# Patient Record
Sex: FEMALE | Race: WHITE | NOT HISPANIC OR LATINO
[De-identification: names, ages, dates, MRNs, and addresses within clinical notes are randomized per-mention and may not be internally consistent; named-entity substitution may affect disease eponyms.]

---

## 2017-02-27 ENCOUNTER — RESULT REVIEW (OUTPATIENT)
Age: 47
End: 2017-02-27

## 2017-02-28 ENCOUNTER — APPOINTMENT (OUTPATIENT)
Age: 47
End: 2017-02-28

## 2017-02-28 ENCOUNTER — OUTPATIENT (OUTPATIENT)
Dept: OUTPATIENT SERVICES | Facility: HOSPITAL | Age: 47
LOS: 1 days | End: 2017-02-28
Payer: COMMERCIAL

## 2017-02-28 DIAGNOSIS — R93.3 ABNORMAL FINDINGS ON DIAGNOSTIC IMAGING OF OTHER PARTS OF DIGESTIVE TRACT: ICD-10-CM

## 2017-02-28 DIAGNOSIS — R19.7 DIARRHEA, UNSPECIFIED: ICD-10-CM

## 2017-02-28 DIAGNOSIS — R14.0 ABDOMINAL DISTENSION (GASEOUS): ICD-10-CM

## 2017-02-28 DIAGNOSIS — R10.32 LEFT LOWER QUADRANT PAIN: ICD-10-CM

## 2017-02-28 DIAGNOSIS — K90.0 CELIAC DISEASE: ICD-10-CM

## 2017-02-28 DIAGNOSIS — R10.9 UNSPECIFIED ABDOMINAL PAIN: ICD-10-CM

## 2017-02-28 DIAGNOSIS — Z83.71 FAMILY HISTORY OF COLONIC POLYPS: ICD-10-CM

## 2017-02-28 PROCEDURE — 45380 COLONOSCOPY AND BIOPSY: CPT

## 2017-02-28 PROCEDURE — 88305 TISSUE EXAM BY PATHOLOGIST: CPT | Mod: 26

## 2017-02-28 PROCEDURE — 88342 IMHCHEM/IMCYTCHM 1ST ANTB: CPT | Mod: 26

## 2017-02-28 PROCEDURE — 88305 TISSUE EXAM BY PATHOLOGIST: CPT

## 2017-02-28 PROCEDURE — 43239 EGD BIOPSY SINGLE/MULTIPLE: CPT

## 2017-02-28 PROCEDURE — 88342 IMHCHEM/IMCYTCHM 1ST ANTB: CPT

## 2017-03-01 LAB — SURGICAL PATHOLOGY FINAL REPORT - CH: SIGNIFICANT CHANGE UP

## 2017-08-03 ENCOUNTER — OUTPATIENT (OUTPATIENT)
Dept: OUTPATIENT SERVICES | Facility: HOSPITAL | Age: 47
LOS: 1 days | End: 2017-08-03
Payer: COMMERCIAL

## 2017-08-03 ENCOUNTER — TRANSCRIPTION ENCOUNTER (OUTPATIENT)
Age: 47
End: 2017-08-03

## 2017-08-03 DIAGNOSIS — G90.521 COMPLEX REGIONAL PAIN SYNDROME I OF RIGHT LOWER LIMB: ICD-10-CM

## 2017-08-03 PROCEDURE — 76000 FLUOROSCOPY <1 HR PHYS/QHP: CPT

## 2017-08-03 PROCEDURE — 64520 N BLOCK LUMBAR/THORACIC: CPT

## 2017-09-14 ENCOUNTER — OUTPATIENT (OUTPATIENT)
Dept: OUTPATIENT SERVICES | Facility: HOSPITAL | Age: 47
LOS: 1 days | End: 2017-09-14
Payer: COMMERCIAL

## 2017-09-14 ENCOUNTER — TRANSCRIPTION ENCOUNTER (OUTPATIENT)
Age: 47
End: 2017-09-14

## 2017-09-14 DIAGNOSIS — G90.521 COMPLEX REGIONAL PAIN SYNDROME I OF RIGHT LOWER LIMB: ICD-10-CM

## 2017-09-14 PROCEDURE — 76000 FLUOROSCOPY <1 HR PHYS/QHP: CPT

## 2017-09-14 PROCEDURE — 64520 N BLOCK LUMBAR/THORACIC: CPT | Mod: RT

## 2017-11-09 ENCOUNTER — TRANSCRIPTION ENCOUNTER (OUTPATIENT)
Age: 47
End: 2017-11-09

## 2017-11-09 ENCOUNTER — OUTPATIENT (OUTPATIENT)
Dept: OUTPATIENT SERVICES | Facility: HOSPITAL | Age: 47
LOS: 1 days | End: 2017-11-09
Payer: COMMERCIAL

## 2017-11-09 DIAGNOSIS — G90.521 COMPLEX REGIONAL PAIN SYNDROME I OF RIGHT LOWER LIMB: ICD-10-CM

## 2017-11-09 PROCEDURE — 64520 N BLOCK LUMBAR/THORACIC: CPT | Mod: RT

## 2017-11-09 PROCEDURE — 76000 FLUOROSCOPY <1 HR PHYS/QHP: CPT

## 2017-12-21 ENCOUNTER — APPOINTMENT (OUTPATIENT)
Dept: CARDIOLOGY | Facility: CLINIC | Age: 47
End: 2017-12-21

## 2017-12-22 ENCOUNTER — APPOINTMENT (OUTPATIENT)
Dept: CARDIOLOGY | Facility: CLINIC | Age: 47
End: 2017-12-22

## 2018-02-22 ENCOUNTER — APPOINTMENT (OUTPATIENT)
Dept: ORTHOPEDIC SURGERY | Facility: CLINIC | Age: 48
End: 2018-02-22

## 2018-03-08 ENCOUNTER — OUTPATIENT (OUTPATIENT)
Dept: OUTPATIENT SERVICES | Facility: HOSPITAL | Age: 48
LOS: 1 days | End: 2018-03-08
Payer: COMMERCIAL

## 2018-03-08 ENCOUNTER — TRANSCRIPTION ENCOUNTER (OUTPATIENT)
Age: 48
End: 2018-03-08

## 2018-03-08 DIAGNOSIS — G90.521 COMPLEX REGIONAL PAIN SYNDROME I OF RIGHT LOWER LIMB: ICD-10-CM

## 2018-03-08 PROCEDURE — 76000 FLUOROSCOPY <1 HR PHYS/QHP: CPT

## 2018-03-08 PROCEDURE — 64520 N BLOCK LUMBAR/THORACIC: CPT | Mod: RT

## 2018-05-11 ENCOUNTER — APPOINTMENT (OUTPATIENT)
Dept: PULMONOLOGY | Facility: CLINIC | Age: 48
End: 2018-05-11
Payer: COMMERCIAL

## 2018-05-11 VITALS
WEIGHT: 207 LBS | BODY MASS INDEX: 36.67 KG/M2 | SYSTOLIC BLOOD PRESSURE: 132 MMHG | HEART RATE: 74 BPM | OXYGEN SATURATION: 98 % | DIASTOLIC BLOOD PRESSURE: 80 MMHG

## 2018-05-11 DIAGNOSIS — J32.9 CHRONIC SINUSITIS, UNSPECIFIED: ICD-10-CM

## 2018-05-11 PROCEDURE — 99215 OFFICE O/P EST HI 40 MIN: CPT | Mod: 25

## 2018-05-11 PROCEDURE — 94010 BREATHING CAPACITY TEST: CPT

## 2018-05-11 RX ORDER — PREDNISONE 20 MG/1
20 TABLET ORAL
Qty: 10 | Refills: 0 | Status: DISCONTINUED | COMMUNITY
Start: 2018-03-12

## 2018-05-11 RX ORDER — ALPRAZOLAM 1 MG/1
1 TABLET ORAL
Qty: 2 | Refills: 0 | Status: DISCONTINUED | COMMUNITY
Start: 2018-03-20

## 2018-05-11 RX ORDER — AMOXICILLIN 875 MG/1
875 TABLET, FILM COATED ORAL
Qty: 20 | Refills: 0 | Status: DISCONTINUED | COMMUNITY
Start: 2017-12-06

## 2018-05-11 RX ORDER — FLUNISOLIDE 0.25 MG/ML
25 MCG/ACT SOLUTION NASAL
Qty: 25 | Refills: 0 | Status: DISCONTINUED | COMMUNITY
Start: 2017-12-22

## 2018-05-11 RX ORDER — LIDOCAINE 50 MG/G
5 CREAM TOPICAL
Qty: 15 | Refills: 0 | Status: DISCONTINUED | COMMUNITY
Start: 2018-02-22

## 2018-05-11 RX ORDER — ONDANSETRON 4 MG/1
4 TABLET, ORALLY DISINTEGRATING ORAL
Qty: 20 | Refills: 0 | Status: DISCONTINUED | COMMUNITY
Start: 2018-02-02

## 2018-05-11 RX ORDER — PHENAZOPYRIDINE HYDROCHLORIDE 200 MG/1
200 TABLET ORAL
Qty: 6 | Refills: 0 | Status: DISCONTINUED | COMMUNITY
Start: 2018-01-05

## 2018-05-11 RX ORDER — LISINOPRIL 5 MG/1
5 TABLET ORAL
Qty: 90 | Refills: 0 | Status: DISCONTINUED | COMMUNITY
Start: 2018-01-14

## 2018-05-11 RX ORDER — KETOROLAC TROMETHAMINE 10 MG/1
10 TABLET, FILM COATED ORAL
Qty: 9 | Refills: 0 | Status: DISCONTINUED | COMMUNITY
Start: 2018-03-09

## 2018-05-11 RX ORDER — AMOXICILLIN AND CLAVULANATE POTASSIUM 875; 125 MG/1; MG/1
875-125 TABLET, COATED ORAL
Qty: 20 | Refills: 0 | Status: DISCONTINUED | COMMUNITY
Start: 2018-03-12

## 2018-05-11 RX ORDER — CIPROFLOXACIN HYDROCHLORIDE 500 MG/1
500 TABLET, FILM COATED ORAL
Qty: 10 | Refills: 0 | Status: DISCONTINUED | COMMUNITY
Start: 2018-01-15

## 2018-05-11 RX ORDER — OSELTAMIVIR PHOSPHATE 75 MG/1
75 CAPSULE ORAL
Qty: 10 | Refills: 0 | Status: DISCONTINUED | COMMUNITY
Start: 2018-02-02

## 2018-05-11 RX ORDER — CIPROFLOXACIN AND DEXAMETHASONE 3; 1 MG/ML; MG/ML
0.3-0.1 SUSPENSION/ DROPS AURICULAR (OTIC)
Qty: 7 | Refills: 0 | Status: DISCONTINUED | COMMUNITY
Start: 2017-12-22

## 2018-05-11 RX ORDER — AZELASTINE HYDROCHLORIDE 137 UG/1
0.1 SPRAY, METERED NASAL
Qty: 30 | Refills: 0 | Status: DISCONTINUED | COMMUNITY
Start: 2017-12-22

## 2018-05-11 RX ORDER — ACETYLCYSTEINE 200 MG/ML
20 SOLUTION ORAL; RESPIRATORY (INHALATION)
Qty: 100 | Refills: 0 | Status: DISCONTINUED | COMMUNITY
Start: 2018-04-26

## 2018-05-11 RX ORDER — ALPRAZOLAM 0.25 MG/1
0.25 TABLET ORAL
Qty: 20 | Refills: 0 | Status: DISCONTINUED | COMMUNITY
Start: 2018-01-05

## 2018-05-11 RX ORDER — METHYLPREDNISOLONE 4 MG/1
4 TABLET ORAL
Qty: 21 | Refills: 0 | Status: DISCONTINUED | COMMUNITY
Start: 2017-12-22

## 2018-05-11 RX ORDER — NITROFURANTOIN (MONOHYDRATE/MACROCRYSTALS) 25; 75 MG/1; MG/1
100 CAPSULE ORAL
Qty: 10 | Refills: 0 | Status: DISCONTINUED | COMMUNITY
Start: 2018-01-05

## 2018-05-11 RX ORDER — AZITHROMYCIN 250 MG/1
250 TABLET, FILM COATED ORAL
Qty: 6 | Refills: 0 | Status: DISCONTINUED | COMMUNITY
Start: 2017-12-22

## 2018-05-11 RX ORDER — FAMOTIDINE 40 MG/1
40 TABLET, FILM COATED ORAL
Qty: 30 | Refills: 0 | Status: DISCONTINUED | COMMUNITY
Start: 2018-04-26

## 2018-05-17 ENCOUNTER — APPOINTMENT (OUTPATIENT)
Dept: GASTROENTEROLOGY | Facility: CLINIC | Age: 48
End: 2018-05-17
Payer: COMMERCIAL

## 2018-05-17 VITALS
DIASTOLIC BLOOD PRESSURE: 87 MMHG | HEART RATE: 75 BPM | HEIGHT: 63 IN | RESPIRATION RATE: 16 BRPM | OXYGEN SATURATION: 98 % | WEIGHT: 209 LBS | SYSTOLIC BLOOD PRESSURE: 127 MMHG | BODY MASS INDEX: 37.03 KG/M2

## 2018-05-17 PROCEDURE — 99214 OFFICE O/P EST MOD 30 MIN: CPT

## 2018-11-29 ENCOUNTER — APPOINTMENT (OUTPATIENT)
Dept: ORTHOPEDIC SURGERY | Facility: CLINIC | Age: 48
End: 2018-11-29
Payer: COMMERCIAL

## 2018-11-29 VITALS
SYSTOLIC BLOOD PRESSURE: 128 MMHG | WEIGHT: 213 LBS | HEIGHT: 63 IN | HEART RATE: 56 BPM | DIASTOLIC BLOOD PRESSURE: 82 MMHG | BODY MASS INDEX: 37.74 KG/M2

## 2018-11-29 DIAGNOSIS — Z87.09 PERSONAL HISTORY OF OTHER DISEASES OF THE RESPIRATORY SYSTEM: ICD-10-CM

## 2018-11-29 DIAGNOSIS — Z82.61 FAMILY HISTORY OF ARTHRITIS: ICD-10-CM

## 2018-11-29 DIAGNOSIS — Z78.9 OTHER SPECIFIED HEALTH STATUS: ICD-10-CM

## 2018-11-29 PROCEDURE — 99204 OFFICE O/P NEW MOD 45 MIN: CPT | Mod: 25

## 2018-11-29 PROCEDURE — 20550 NJX 1 TENDON SHEATH/LIGAMENT: CPT | Mod: 59

## 2019-01-03 ENCOUNTER — APPOINTMENT (OUTPATIENT)
Dept: ORTHOPEDIC SURGERY | Facility: CLINIC | Age: 49
End: 2019-01-03
Payer: COMMERCIAL

## 2019-01-03 VITALS
SYSTOLIC BLOOD PRESSURE: 126 MMHG | BODY MASS INDEX: 37.74 KG/M2 | HEIGHT: 63 IN | WEIGHT: 213 LBS | DIASTOLIC BLOOD PRESSURE: 85 MMHG | HEART RATE: 62 BPM

## 2019-01-03 DIAGNOSIS — M65.331 TRIGGER FINGER, RIGHT MIDDLE FINGER: ICD-10-CM

## 2019-01-03 DIAGNOSIS — M65.341 TRIGGER FINGER, RIGHT RING FINGER: ICD-10-CM

## 2019-01-03 PROCEDURE — 99214 OFFICE O/P EST MOD 30 MIN: CPT

## 2019-01-08 ENCOUNTER — OTHER (OUTPATIENT)
Age: 49
End: 2019-01-08

## 2019-01-31 ENCOUNTER — APPOINTMENT (OUTPATIENT)
Dept: ORTHOPEDIC SURGERY | Facility: CLINIC | Age: 49
End: 2019-01-31

## 2019-04-30 ENCOUNTER — APPOINTMENT (OUTPATIENT)
Dept: CARDIOLOGY | Facility: CLINIC | Age: 49
End: 2019-04-30

## 2019-06-06 ENCOUNTER — TRANSCRIPTION ENCOUNTER (OUTPATIENT)
Age: 49
End: 2019-06-06

## 2019-06-07 ENCOUNTER — OUTPATIENT (OUTPATIENT)
Dept: OUTPATIENT SERVICES | Facility: HOSPITAL | Age: 49
LOS: 1 days | End: 2019-06-07
Payer: COMMERCIAL

## 2019-06-07 DIAGNOSIS — M47.817 SPONDYLOSIS WITHOUT MYELOPATHY OR RADICULOPATHY, LUMBOSACRAL REGION: ICD-10-CM

## 2019-06-20 ENCOUNTER — TRANSCRIPTION ENCOUNTER (OUTPATIENT)
Age: 49
End: 2019-06-20

## 2019-06-21 ENCOUNTER — OUTPATIENT (OUTPATIENT)
Dept: OUTPATIENT SERVICES | Facility: HOSPITAL | Age: 49
LOS: 1 days | End: 2019-06-21
Payer: COMMERCIAL

## 2019-06-21 DIAGNOSIS — M46.1 SACROILIITIS, NOT ELSEWHERE CLASSIFIED: ICD-10-CM

## 2019-06-21 PROCEDURE — 64494 INJ PARAVERT F JNT L/S 2 LEV: CPT | Mod: RT

## 2019-06-21 PROCEDURE — 64493 INJ PARAVERT F JNT L/S 1 LEV: CPT | Mod: RT

## 2019-06-21 PROCEDURE — 64495 INJ PARAVERT F JNT L/S 3 LEV: CPT | Mod: RT

## 2019-06-21 PROCEDURE — 76000 FLUOROSCOPY <1 HR PHYS/QHP: CPT

## 2019-06-21 PROCEDURE — G0260: CPT | Mod: 50

## 2019-08-16 ENCOUNTER — EMERGENCY (EMERGENCY)
Facility: HOSPITAL | Age: 49
LOS: 1 days | Discharge: HOME | End: 2019-08-16
Attending: EMERGENCY MEDICINE
Payer: COMMERCIAL

## 2019-08-16 VITALS
HEART RATE: 69 BPM | WEIGHT: 205.03 LBS | SYSTOLIC BLOOD PRESSURE: 121 MMHG | HEIGHT: 63 IN | DIASTOLIC BLOOD PRESSURE: 79 MMHG | RESPIRATION RATE: 18 BRPM | TEMPERATURE: 98 F | OXYGEN SATURATION: 97 %

## 2019-08-16 PROCEDURE — 96372 THER/PROPH/DIAG INJ SC/IM: CPT

## 2019-08-16 PROCEDURE — 99283 EMERGENCY DEPT VISIT LOW MDM: CPT | Mod: 25

## 2019-08-16 PROCEDURE — 99283 EMERGENCY DEPT VISIT LOW MDM: CPT

## 2019-08-16 RX ORDER — LIDOCAINE 4 G/100G
1 CREAM TOPICAL ONCE
Refills: 0 | Status: COMPLETED | OUTPATIENT
Start: 2019-08-16 | End: 2019-08-16

## 2019-08-16 RX ORDER — ACETAMINOPHEN 500 MG
975 TABLET ORAL ONCE
Refills: 0 | Status: COMPLETED | OUTPATIENT
Start: 2019-08-16 | End: 2019-08-16

## 2019-08-16 RX ORDER — KETOROLAC TROMETHAMINE 30 MG/ML
30 SYRINGE (ML) INJECTION ONCE
Refills: 0 | Status: DISCONTINUED | OUTPATIENT
Start: 2019-08-16 | End: 2019-08-16

## 2019-08-16 RX ORDER — CYCLOBENZAPRINE HYDROCHLORIDE 10 MG/1
1 TABLET, FILM COATED ORAL
Qty: 6 | Refills: 0
Start: 2019-08-16 | End: 2019-08-17

## 2019-08-16 RX ORDER — CYCLOBENZAPRINE HYDROCHLORIDE 10 MG/1
5 TABLET, FILM COATED ORAL ONCE
Refills: 0 | Status: COMPLETED | OUTPATIENT
Start: 2019-08-16 | End: 2019-08-16

## 2019-08-16 RX ADMIN — Medication 30 MILLIGRAM(S): at 12:17

## 2019-08-16 RX ADMIN — CYCLOBENZAPRINE HYDROCHLORIDE 5 MILLIGRAM(S): 10 TABLET, FILM COATED ORAL at 12:17

## 2019-08-16 RX ADMIN — LIDOCAINE 1 PATCH: 4 CREAM TOPICAL at 12:19

## 2019-08-16 RX ADMIN — Medication 975 MILLIGRAM(S): at 12:16

## 2019-08-16 NOTE — ED PROVIDER NOTE - PHYSICAL EXAMINATION
MSK: TTP paraspinal muscle at lumbar region. no midline tenderness / stepoff. Full ROM b/l upper and lower extrems. Limited dorsal flexion of R foot secondary to  pain.

## 2019-08-16 NOTE — ED PROVIDER NOTE - CLINICAL SUMMARY MEDICAL DECISION MAKING FREE TEXT BOX
Exacerbation of chronic back pain, pain control, ortho spine referral. Exacerbation of chronic back pain, pain control, Neurology referral.

## 2019-08-16 NOTE — ED PROVIDER NOTE - ATTENDING CONTRIBUTION TO CARE
Patient presents with back pain.  discussed options and treatment progression.  feeling improved after meds.  will f/u.  Non toxic.  Well appearing. Uneventful ED observation period. Discussed results and outcome of testing with the patient.  Patient advised to please follow up with their primary care doctor within the next 24 hours and return to the Emergency Department for worsening symptoms or any other concerns.  Patient advised that their doctor may call  to follow up on the specific results of the tests performed today in the emergency department.

## 2019-08-16 NOTE — ED PROVIDER NOTE - OBJECTIVE STATEMENT
Pt is a 49 y/o F, PMH significant for R foot RSD, chronic back pain, presents to ED c/o lower back pain. Pt states she had an accident 4 years ago that left her with chronic back pain. Pt was being followed by pain management until last month when she was told she needed to follow up with orthopaedics for further management. Pt states she has not attempted to take any medication in attempt to alleviate her symptoms. Pt states the pain begins in her lower back and radiates down her R leg into her R foot. Pt also states she has some paresthesias in her lateral R calf. Pt has not had any recent falls or traumatic events. Pt denies urinary retention / incontinence, CP, recent unexplained weight loss, fevers, chills.

## 2019-09-23 ENCOUNTER — OTHER (OUTPATIENT)
Age: 49
End: 2019-09-23

## 2019-10-01 ENCOUNTER — APPOINTMENT (OUTPATIENT)
Dept: ORTHOPEDIC SURGERY | Facility: CLINIC | Age: 49
End: 2019-10-01
Payer: COMMERCIAL

## 2019-10-01 VITALS
HEIGHT: 63 IN | DIASTOLIC BLOOD PRESSURE: 77 MMHG | HEART RATE: 62 BPM | WEIGHT: 213 LBS | BODY MASS INDEX: 37.74 KG/M2 | SYSTOLIC BLOOD PRESSURE: 118 MMHG

## 2019-10-01 PROCEDURE — 73502 X-RAY EXAM HIP UNI 2-3 VIEWS: CPT | Mod: RT

## 2019-10-01 PROCEDURE — 20610 DRAIN/INJ JOINT/BURSA W/O US: CPT | Mod: RT

## 2019-10-01 PROCEDURE — 99204 OFFICE O/P NEW MOD 45 MIN: CPT | Mod: 25

## 2019-10-01 RX ORDER — MELOXICAM 15 MG/1
15 TABLET ORAL DAILY
Qty: 14 | Refills: 0 | Status: DISCONTINUED | COMMUNITY
Start: 2019-01-03 | End: 2019-10-01

## 2019-10-01 RX ORDER — METHYLPREDNISOLONE ACETATE 40 MG/ML
40 INJECTION, SUSPENSION INTRA-ARTICULAR; INTRALESIONAL; INTRAMUSCULAR; SOFT TISSUE
Qty: 1 | Refills: 0 | Status: DISCONTINUED | COMMUNITY
Start: 2018-11-29 | End: 2019-10-01

## 2019-10-01 RX ORDER — RANITIDINE 300 MG/1
300 TABLET ORAL
Qty: 90 | Refills: 1 | Status: DISCONTINUED | COMMUNITY
Start: 2018-05-17 | End: 2019-10-01

## 2019-10-01 RX ORDER — PREDNISONE 10 MG/1
10 TABLET ORAL
Qty: 3 | Refills: 0 | Status: DISCONTINUED | COMMUNITY
Start: 2018-04-26 | End: 2019-10-01

## 2019-10-01 RX ORDER — LANSOPRAZOLE 15 MG/1
15 CAPSULE, DELAYED RELEASE ORAL
Refills: 0 | Status: DISCONTINUED | COMMUNITY
End: 2019-10-01

## 2019-10-01 NOTE — REVIEW OF SYSTEMS
[Joint Pain] : joint pain [Joint Stiffness] : joint stiffness [Feeling Tired] : fatigue [Heartburn] : heartburn [Sleep Disturbances] : ~T sleep disturbances

## 2019-10-01 NOTE — CONSULT LETTER
[Dear  ___] : Dear  [unfilled], [Consult Letter:] : I had the pleasure of evaluating your patient, [unfilled]. [Please see my note below.] : Please see my note below. [Consult Closing:] : Thank you very much for allowing me to participate in the care of this patient.  If you have any questions, please do not hesitate to contact me. [Sincerely,] : Sincerely, [FreeTextEntry2] : CAROLYN LOCKETT MD\par  [FreeTextEntry3] : Pedrito Gonsalez MD\par Chief of Joint Replacement\par Primary & Revision Hip and Knee Replacement \par [facility] [place]\par \par

## 2019-10-01 NOTE — PHYSICAL EXAM
[de-identified] : The patient appears well nourished  and in no apparent distress.  The patient is alert and oriented to person, place, and time.   Affect and mood appear normal. The head is normocephalic and atraumatic.  The eyes reveal normal sclera and extra ocular muscles are intact. The tongue is midline with no apparent lesions.  Skin shows normal turgor with no evidence of eczema or psoriasis.  No respiratory distress noted.  Sensation grossly intact.\par   [de-identified] : Exam of the right hip shows numbness and pain of distribution of lateral femoral cutaneous nerve, no pain with ROM, pain with palpation of the trochanteric bursa. 5/5 motor strength bilaterally distally. Sensation intact distally.  [de-identified] : Xray- AP pelvis and 2 views right hip shows well preserved joint spaces of the right hip.

## 2019-10-01 NOTE — HISTORY OF PRESENT ILLNESS
[de-identified] : The patient is a 49 year old female being seen for evaluation of her right hip.She denies injury, trauma, or change of activity. She reports a 2 month duration of lateral hip pain. She describes her symptoms as 7-8/10 on the pain scale. She reports pain over the superior iliac crest and in the region of the greater trochanter. At times with transferring pain will radiate into the groin. She will note pain after prolonged walking. She is unable to lie on her right side at night. In addition she reports associated numbness and a "cold sensation" radiating down the lateral hip at times. She does have an appointment to followup with a neurologist. She is not taking any medication. She is not doing any formal exercise.

## 2019-10-01 NOTE — PROCEDURE
[de-identified] : Verbal consent was obtained and all questions, comments and concerns were addressed. After the greater trochanteric area on the right side was anesthetized with ethyl chloride, it was cleansed with chlorhexidine . Using sterile technique, at the point of maximum tenderness, 5cc of 1 percent lidocaine without epinephrine, 2cc of 0.25% marcaine, and 80 mg depomedrol was injected. The patient tolerated the procedure well. A dry sterile dressing was placed and the patient described relief of pain 5 minutes after the procedure was performed. The patient was able to leave the office under their own power.\par \par \par

## 2019-10-01 NOTE — DISCUSSION/SUMMARY
[de-identified] : The patient is a 49 year old female with pain in the distribution of lateral femoral cutaneous nerve as well as trochanteric bursitis of the right hip. Conservative options were discussed.  For the meralgia paresthetica she has an appointment with a neurologist.  She was give a cortisone injection in the right hip bursa. She was given a prescription for physical therapy and anti-inflammatories. I recommended a course of Mobic. The patient was given a prescription for the Mobic with directions. She was instructed to stop the medicine and call the office if there are any adverse reaction to the medicine. She was also instructed to consult with their primary care doctor prior to starting the medication. She may follow up as needed.

## 2019-10-01 NOTE — ADDENDUM
[FreeTextEntry1] : This note was authored by Juni Rangel working as a medical scribe for Dr. Pedrito Gonsalez. The note was reviewed, edited, and revised by Dr. Pedrito Gonsalez whom is in agreement with the exam findings, imaging findings, and treatment plan. 10/01/2019.

## 2020-01-06 ENCOUNTER — EMERGENCY (EMERGENCY)
Facility: HOSPITAL | Age: 50
LOS: 1 days | Discharge: HOME | End: 2020-01-06
Attending: EMERGENCY MEDICINE
Payer: COMMERCIAL

## 2020-01-06 VITALS
HEART RATE: 65 BPM | RESPIRATION RATE: 24 BRPM | DIASTOLIC BLOOD PRESSURE: 84 MMHG | HEIGHT: 63 IN | TEMPERATURE: 98 F | SYSTOLIC BLOOD PRESSURE: 123 MMHG | WEIGHT: 199.96 LBS | OXYGEN SATURATION: 96 %

## 2020-01-06 PROCEDURE — 99284 EMERGENCY DEPT VISIT MOD MDM: CPT

## 2020-01-06 PROCEDURE — 71045 X-RAY EXAM CHEST 1 VIEW: CPT

## 2020-01-06 PROCEDURE — 71045 X-RAY EXAM CHEST 1 VIEW: CPT | Mod: 26

## 2020-01-06 PROCEDURE — 94640 AIRWAY INHALATION TREATMENT: CPT

## 2020-01-06 PROCEDURE — 99283 EMERGENCY DEPT VISIT LOW MDM: CPT | Mod: 25

## 2020-01-06 RX ORDER — IPRATROPIUM/ALBUTEROL SULFATE 18-103MCG
3 AEROSOL WITH ADAPTER (GRAM) INHALATION ONCE
Refills: 0 | Status: COMPLETED | OUTPATIENT
Start: 2020-01-06 | End: 2020-01-06

## 2020-01-06 RX ORDER — LISINOPRIL 2.5 MG/1
1 TABLET ORAL
Qty: 0 | Refills: 0 | DISCHARGE

## 2020-01-06 RX ADMIN — Medication 3 MILLILITER(S): at 04:23

## 2020-01-06 RX ADMIN — Medication 50 MILLIGRAM(S): at 04:23

## 2020-01-06 NOTE — ED ADULT NURSE NOTE - OBJECTIVE STATEMENT
patient presents to ED with cough and SOB worsening over the last few days and cough with brown mucous. hx of asthma, took home nebs with no relief. friday went to PMD and was dx with flu A. on tamiflu and PO steroids at home. c/o back pain with cough, did not take anything for pain.

## 2020-01-06 NOTE — ED PROVIDER NOTE - OBJECTIVE STATEMENT
pt dx with flu 3 days prior by pcp on tamiflu presents with persistent cough and wheeze. denies fever. denies HA or neck pain. no chest pain or sob. no abd pain. no n/v/d. no urinary f/u/d. no back pain. no motor or sensory deficits. denies illicit drug use. no recent travel. no rash. no other acute issues symptoms or concerns no hemotpysis

## 2020-01-06 NOTE — ED ADULT NURSE NOTE - CHIEF COMPLAINT QUOTE
patient states that she has the FLU was DX and today feeling SOB, has Asthma used neb treatments not working

## 2020-01-06 NOTE — ED ADULT TRIAGE NOTE - MODE OF ARRIVAL
PATIENT GIVEN SURGICAL SITE INFECTION FAQ HANDOUT AND HAND WASHING TIP SHEET. PREOP INSTRUCTIONS REVIEWED AND PATIENT VERBALIZES UNDERSTANDING OF INSTRUCTIONS. PATIENT HAS BEEN GIVEN THE OPPORTUNITY TO ASK QUESTIONS.  HIBICLENS C INSTRUCTIONS WERE GIVEN TO THE PT.
Walk in

## 2020-01-06 NOTE — ED PROVIDER NOTE - PATIENT PORTAL LINK FT
You can access the FollowMyHealth Patient Portal offered by [facility] by registering at the following website: [email or web address]. By joining [employer]’s FollowMyHealth portal, you will also be able to view your health information using other applications (apps) compatible with our system.

## 2020-01-06 NOTE — ED PROVIDER NOTE - CLINICAL SUMMARY MEDICAL DECISION MAKING FREE TEXT BOX
in nad cxr no pneumoniia f.u pcp return to ed for intractable cp sor or new onset motor or sensory deficits

## 2020-01-07 NOTE — ED POST DISCHARGE NOTE - DETAILS
Spoke to pt, informed of results, given information for CT surgeon Dr. Ford and advised to f/u with PMD. Pt verbalized understanding and agreement of plan.

## 2020-01-09 PROBLEM — J45.909 UNSPECIFIED ASTHMA, UNCOMPLICATED: Chronic | Status: ACTIVE | Noted: 2020-01-06

## 2020-01-09 PROBLEM — I10 ESSENTIAL (PRIMARY) HYPERTENSION: Chronic | Status: ACTIVE | Noted: 2020-01-06

## 2020-01-31 ENCOUNTER — APPOINTMENT (OUTPATIENT)
Dept: PULMONOLOGY | Facility: CLINIC | Age: 50
End: 2020-01-31
Payer: COMMERCIAL

## 2020-01-31 VITALS
SYSTOLIC BLOOD PRESSURE: 122 MMHG | HEIGHT: 63 IN | HEART RATE: 74 BPM | OXYGEN SATURATION: 97 % | WEIGHT: 211 LBS | DIASTOLIC BLOOD PRESSURE: 84 MMHG | BODY MASS INDEX: 37.39 KG/M2

## 2020-01-31 DIAGNOSIS — R91.8 OTHER NONSPECIFIC ABNORMAL FINDING OF LUNG FIELD: ICD-10-CM

## 2020-01-31 PROCEDURE — 99215 OFFICE O/P EST HI 40 MIN: CPT | Mod: 25

## 2020-01-31 PROCEDURE — 94010 BREATHING CAPACITY TEST: CPT

## 2020-01-31 RX ORDER — SUCRALFATE 1 G/10ML
1 SUSPENSION ORAL 4 TIMES DAILY
Qty: 1200 | Refills: 1 | Status: DISCONTINUED | COMMUNITY
Start: 2018-05-17 | End: 2020-01-31

## 2020-01-31 RX ORDER — BUDESONIDE AND FORMOTEROL FUMARATE DIHYDRATE 160; 4.5 UG/1; UG/1
160-4.5 AEROSOL RESPIRATORY (INHALATION) TWICE DAILY
Qty: 1 | Refills: 5 | Status: DISCONTINUED | COMMUNITY
Start: 2018-04-26 | End: 2020-01-31

## 2020-01-31 RX ORDER — PANTOPRAZOLE 40 MG/1
40 TABLET, DELAYED RELEASE ORAL TWICE DAILY
Qty: 180 | Refills: 1 | Status: DISCONTINUED | COMMUNITY
Start: 2018-05-17 | End: 2020-01-31

## 2020-01-31 NOTE — REVIEW OF SYSTEMS
[Nasal Congestion] : nasal congestion [Negative] : Psychiatric [TextBox_3] : see HPI [TextBox_30] : see HPI [TextBox_44] : followed by Cardiologist

## 2020-01-31 NOTE — PROCEDURE
[FreeTextEntry1] : spirometry  remains normal\par CT scan minute GG nodules RUL\par small GG nodule RLL, and few cm RLL GG infiltrate at base

## 2020-01-31 NOTE — PHYSICAL EXAM
[No Acute Distress] : no acute distress [IV] : Mallampati Class: IV [Normal Appearance] : normal appearance [No Neck Mass] : no neck mass [Normal Rate/Rhythm] : normal rate/rhythm [Normal S1, S2] : normal s1, s2 [No Murmurs] : no murmurs [No Resp Distress] : no resp distress [No Acc Muscle Use] : no acc muscle use [Clear to Auscultation Bilaterally] : clear to auscultation bilaterally [Normal to Percussion] : normal to percussion [No Abnormalities] : no abnormalities [Benign] : benign [Normal Gait] : normal gait [No Clubbing] : no clubbing [No Cyanosis] : no cyanosis [No Edema] : no edema [Oriented x3] : oriented x3 [Normal Affect] : normal affect

## 2020-01-31 NOTE — HISTORY OF PRESENT ILLNESS
[Never] : never [TextBox_4] : saw PMD told flu, was on Tamiflu, medrol, than "sinus infection", given amoxil\par never smoker\par was ill fro weeks\par now improving\par uses xopenex and budesonide neb\par still has sputum, improved\par no fever, chill, chest pain\par appetite improved\par has chronic dyspnea stable\par went to hospital had CXR\par told needed CT scan, had GG nodules

## 2020-01-31 NOTE — REASON FOR VISIT
[Follow-Up] : a follow-up visit [Abnormal CXR/ Chest CT] : an abnormal CXR/ chest CT [Asthma] : asthma

## 2020-01-31 NOTE — CONSULT LETTER
[Dear  ___] : Dear  [unfilled], [Consult Letter:] : I had the pleasure of evaluating your patient, [unfilled]. [Please see my note below.] : Please see my note below. [Sincerely,] : Sincerely, [FreeTextEntry3] : Gonzales Jeffrey DO [facility]\par Pulmonary Critical Care\par Director Pulmonary Division\par Medical Director Respiratory Therapy\par [facility]\par \par

## 2020-03-17 ENCOUNTER — APPOINTMENT (OUTPATIENT)
Dept: NEUROSURGERY | Facility: CLINIC | Age: 50
End: 2020-03-17

## 2020-03-26 ENCOUNTER — APPOINTMENT (OUTPATIENT)
Dept: PULMONOLOGY | Facility: CLINIC | Age: 50
End: 2020-03-26

## 2020-05-26 ENCOUNTER — APPOINTMENT (OUTPATIENT)
Dept: PAIN MANAGEMENT | Facility: CLINIC | Age: 50
End: 2020-05-26

## 2020-06-03 ENCOUNTER — APPOINTMENT (OUTPATIENT)
Dept: PULMONOLOGY | Facility: CLINIC | Age: 50
End: 2020-06-03
Payer: COMMERCIAL

## 2020-06-03 VITALS
WEIGHT: 205 LBS | BODY MASS INDEX: 36.31 KG/M2 | OXYGEN SATURATION: 98 % | HEART RATE: 75 BPM | SYSTOLIC BLOOD PRESSURE: 118 MMHG | DIASTOLIC BLOOD PRESSURE: 80 MMHG

## 2020-06-03 DIAGNOSIS — J45.909 UNSPECIFIED ASTHMA, UNCOMPLICATED: ICD-10-CM

## 2020-06-03 PROCEDURE — 99214 OFFICE O/P EST MOD 30 MIN: CPT

## 2020-06-03 NOTE — HISTORY OF PRESENT ILLNESS
[Never] : never [TextBox_4] : doing well\par no acute pulmonary complaints\par uses xopenex/budesonide as needed

## 2020-06-03 NOTE — PHYSICAL EXAM
[No Acute Distress] : no acute distress [IV] : Mallampati Class: IV [Normal Appearance] : normal appearance [No Neck Mass] : no neck mass [Normal Rate/Rhythm] : normal rate/rhythm [Normal S1, S2] : normal s1, s2 [No Murmurs] : no murmurs [No Resp Distress] : no resp distress [No Acc Muscle Use] : no acc muscle use [Clear to Auscultation Bilaterally] : clear to auscultation bilaterally [Normal to Percussion] : normal to percussion [No Abnormalities] : no abnormalities [Benign] : benign [Normal Gait] : normal gait [No Cyanosis] : no cyanosis [No Clubbing] : no clubbing [No Edema] : no edema [Oriented x3] : oriented x3 [Normal Affect] : normal affect

## 2020-06-03 NOTE — DISCUSSION/SUMMARY
[FreeTextEntry1] : Obesity, asthma by hx, Never smoker, allergic rhinitis on astelin\par  at baseline, lung exam is normal, with normal sp02\par spirometry remains normal\par continue prn nebs ( xopenex/budesonide nebs)\par repeat CT scan with resolution of GG infiltrates, small hiatal hernia\par aspiration precautions reviewed, HOB elevated, avoiding caffeine, chocolate , mints, and no late night eating\par previous sleep work up with mild BEST ( AHI 9) weight loss recommended\par will re evaluate in 4-6 months or sooner if needed

## 2020-06-03 NOTE — CONSULT LETTER
[Dear  ___] : Dear  [unfilled], [Consult Letter:] : I had the pleasure of evaluating your patient, [unfilled]. [Sincerely,] : Sincerely, [Please see my note below.] : Please see my note below. [FreeTextEntry3] : Gonzales Jeffrey DO [facility]\par Pulmonary Critical Care\par Director Pulmonary Division\par Medical Director Respiratory Therapy\par [facility]\par \par

## 2020-06-23 ENCOUNTER — APPOINTMENT (OUTPATIENT)
Dept: PAIN MANAGEMENT | Facility: CLINIC | Age: 50
End: 2020-06-23

## 2020-07-09 ENCOUNTER — APPOINTMENT (OUTPATIENT)
Dept: NEUROSURGERY | Facility: CLINIC | Age: 50
End: 2020-07-09
Payer: COMMERCIAL

## 2020-07-09 VITALS
WEIGHT: 202 LBS | HEART RATE: 62 BPM | BODY MASS INDEX: 35.79 KG/M2 | HEIGHT: 63 IN | OXYGEN SATURATION: 97 % | DIASTOLIC BLOOD PRESSURE: 77 MMHG | TEMPERATURE: 98.7 F | SYSTOLIC BLOOD PRESSURE: 115 MMHG

## 2020-07-09 DIAGNOSIS — Z85.9 PERSONAL HISTORY OF MALIGNANT NEOPLASM, UNSPECIFIED: ICD-10-CM

## 2020-07-09 DIAGNOSIS — Z83.3 FAMILY HISTORY OF DIABETES MELLITUS: ICD-10-CM

## 2020-07-09 DIAGNOSIS — Z82.49 FAMILY HISTORY OF ISCHEMIC HEART DISEASE AND OTHER DISEASES OF THE CIRCULATORY SYSTEM: ICD-10-CM

## 2020-07-09 DIAGNOSIS — Z82.5 FAMILY HISTORY OF ASTHMA AND OTHER CHRONIC LOWER RESPIRATORY DISEASES: ICD-10-CM

## 2020-07-09 DIAGNOSIS — M54.16 RADICULOPATHY, LUMBAR REGION: ICD-10-CM

## 2020-07-09 PROCEDURE — 99203 OFFICE O/P NEW LOW 30 MIN: CPT

## 2020-07-12 PROBLEM — Z82.49 FAMILY HISTORY OF HYPERTENSION: Status: ACTIVE | Noted: 2020-07-09

## 2020-07-12 PROBLEM — M54.16 LUMBAR RADICULOPATHY: Status: ACTIVE | Noted: 2020-07-09

## 2020-07-12 PROBLEM — Z83.3 FAMILY HISTORY OF DIABETES MELLITUS: Status: ACTIVE | Noted: 2020-07-09

## 2020-07-12 PROBLEM — Z85.9 HISTORY OF MALIGNANT NEOPLASM: Status: RESOLVED | Noted: 2020-07-09 | Resolved: 2020-07-12

## 2020-07-12 PROBLEM — Z82.5 FAMILY HISTORY OF ASTHMA: Status: ACTIVE | Noted: 2020-07-09

## 2020-07-12 NOTE — HISTORY OF PRESENT ILLNESS
[> 3 months] : more  than 3 months [Pain] : pain [Numbness] : numbness [Worsening] : worsening [Bending] : worsened by bending [Lifting] : worsened by lifting [Rest] : relieved by rest [Recumbency] : relieved by recumbency [FreeTextEntry1] : lower back and LE paresthesias/pain [de-identified] : JAIR NEWSOME is a 49 year old female presents for initial neurosurgical evaluation of lumbar radiculopathy.  PMH includes asthma, GERD. Per patient, CRPS.  She mentions her symptoms started about 8 months ago, worsening in nature.  She recalls she sustained a fall on the sidewalk which resulted in right ankle fracture.  No surgical intervention warranted.  She states she developed severe pain in the right ankle and quadriceps since that time.  She endorses left lateral knee pain as well.  The pain is described as constant shooting and electric in nature. She has severe hypersensitivity in the right buttock/lateral thigh and left lateral knee.  Pain intensity 9/10. Patient does not endorse any saddle anesthesia, urinary or bowel incontinence.  She states there is difficulty with ambulation secondary to right ankle pain which exacerbates her lower back pain.  Pain is aggravated by ambulation, bending and twisting.  Patient has not tried any physical therapy as of yet.  She is under the care of pain management.  Pat treatments include lumbar SAMARIA x 4 which provide little relief.  Patient states she was a candidate for a pain stimulator.  No LE EMG.  She is managing her symptoms with NSAIDS.  Patient has been reluctant to try medications such as gabapentin secondary to side effects.  \par  [Incontinence] : no incontinence [Loss of Dexterity] : good dexterity [Ataxia] : no ataxia [Urinary Ret.] : no urinary retention

## 2020-07-12 NOTE — REASON FOR VISIT
[New Patient Visit] : a new patient visit [Referred By: _________] : Patient was referred by MIKALA [FreeTextEntry1] : lumbar radiculopathy

## 2020-07-12 NOTE — DATA REVIEWED
[de-identified] : MRI lumbar spine from 5/14/2019 reveals L4/L5 disc herniation deforming the thecal sac abutting the proximal L5 nerve roots bilaterally.  L3/L4 and L5/S1 disc bulges with L5/S1 abuttment of the proximal S1 nerve roots bilaterally.  Lumbar spine straightening.

## 2020-07-12 NOTE — REVIEW OF SYSTEMS
[As Noted in HPI] : as noted in HPI [Negative] : Heme/Lymph [Tingling] : tingling [Numbness] : numbness [Difficulty Walking] : difficulty walking [FreeTextEntry9] : lower back pain

## 2020-07-12 NOTE — ASSESSMENT
[FreeTextEntry1] : Ms. Olivier presents with above history and imaging.  Given her MRI is greater than 6 months and her symptoms have been worsening, I would like to obtain an MRI umbar spine w/o contrast.  She should follow up after imaging. Patient knows to call the office if there are any new or worsening symptoms.\par

## 2020-07-12 NOTE — PHYSICAL EXAM
[General Appearance - Alert] : alert [General Appearance - Well Nourished] : well nourished [General Appearance - In No Acute Distress] : in no acute distress [Impaired Insight] : insight and judgment were intact [Oriented To Time, Place, And Person] : oriented to person, place, and time [General Appearance - Well Developed] : well developed [Affect] : the affect was normal [Mood] : the mood was normal [Person] : oriented to person [Time] : oriented to time [Place] : oriented to place [Comprehension] : comprehension intact [Concentration Intact] : normal concentrating ability [Fluency] : fluency intact [Short Term Intact] : short term memory intact [Cranial Nerves Optic (II)] : visual acuity intact bilaterally,  pupils equal round and reactive to light [Cranial Nerves Trigeminal (V)] : facial sensation intact symmetrically [Cranial Nerves Oculomotor (III)] : extraocular motion intact [Cranial Nerves Hypoglossal (XII)] : there was no tongue deviation with protrusion [Cranial Nerves Glossopharyngeal (IX)] : tongue and palate midline [Cranial Nerves Accessory (XI - Cranial And Spinal)] : head turning and shoulder shrug symmetric [Cranial Nerves Facial (VII)] : face symmetrical [Sensation Tactile Decrease] : light touch was intact [Sensation Pain / Temperature Decrease] : pain and temperature was intact [Motor Strength] : muscle strength was normal in all four extremities [Motor Tone] : muscle tone was normal in all four extremities [Abnormal Walk] : normal gait [Balance] : balance was intact [No Visual Abnormalities] : no visible abnormalities [Normal] : normal [Over the Past 2 Weeks, Have You Felt Little Interest or Pleasure Doing Things?] : 2.) Over the past 2 weeks, have you felt little interest or pleasure doing things? No [Over the Past 2 Weeks, Have You Felt Down, Depressed, or Hopeless?] : 1.) Over the past 2 weeks, have you felt down, depressed, or hopeless? No [Romberg's Sign] : Romberg's sign was negtive [Allodynia] : ~T allodynia present

## 2020-07-27 ENCOUNTER — APPOINTMENT (OUTPATIENT)
Dept: GASTROENTEROLOGY | Facility: CLINIC | Age: 50
End: 2020-07-27
Payer: COMMERCIAL

## 2020-07-27 VITALS
BODY MASS INDEX: 36.5 KG/M2 | OXYGEN SATURATION: 97 % | HEIGHT: 63 IN | HEART RATE: 62 BPM | WEIGHT: 206 LBS | SYSTOLIC BLOOD PRESSURE: 100 MMHG | TEMPERATURE: 97.7 F | DIASTOLIC BLOOD PRESSURE: 84 MMHG

## 2020-07-27 PROCEDURE — 99214 OFFICE O/P EST MOD 30 MIN: CPT

## 2020-07-27 RX ORDER — ESOMEPRAZOLE MAGNESIUM 5 MG/1
GRANULE, DELAYED RELEASE ORAL
Refills: 0 | Status: ACTIVE | COMMUNITY

## 2020-07-27 NOTE — ASSESSMENT
[FreeTextEntry1] : A/P\par GERD\par Today's instructions for acid reflux include avoid provocative foods. For example citrus alcohol coffee chocolate mints. Smaller meals, no eating 3 hours prior to bedtime and elevate head of the bed prior to sleep.\par start protonix 40 mg qd\par - celiac sprue- will check cbc, cmp, celiac panel tsh\par - lactose intolerance- lactose free diet\par - Family hx of colon polyps- colonoscopy in 2022\par - IBS-  high fiber iet ( no response to levsin in past)\par F/U in 6 weeks

## 2020-07-27 NOTE — REASON FOR VISIT
[Follow-Up: _____] : a [unfilled] follow-up visit [FreeTextEntry1] : GERD, IBS, celiac disease, family hx of colon polyps

## 2020-07-27 NOTE — REVIEW OF SYSTEMS
[Abdominal Pain] : abdominal pain [Heartburn] : heartburn [FreeTextEntry7] : bloating [Negative] : Psychiatric

## 2020-07-27 NOTE — PHYSICAL EXAM
[General Appearance - In No Acute Distress] : in no acute distress [General Appearance - Alert] : alert [General Appearance - Well Nourished] : well nourished [General Appearance - Well Developed] : well developed [Sclera] : the sclera and conjunctiva were normal [Neck Appearance] : the appearance of the neck was normal [Outer Ear] : the ears and nose were normal in appearance [Exaggerated Use Of Accessory Muscles For Inspiration] : no accessory muscle use [Respiration, Rhythm And Depth] : normal respiratory rhythm and effort [Heart Rate And Rhythm] : heart rate was normal and rhythm regular [Apical Impulse] : the apical impulse was normal [Auscultation Breath Sounds / Voice Sounds] : lungs were clear to auscultation bilaterally [Heart Sounds] : normal S1 and S2 [Bowel Sounds] : normal bowel sounds [Abdomen Tenderness] : non-tender [Femoral Lymph Nodes Enlarged Bilaterally] : femoral [Abdomen Soft] : soft [Skin Color & Pigmentation] : normal skin color and pigmentation [Skin Turgor] : normal skin turgor [] : no rash [Oriented To Time, Place, And Person] : oriented to person, place, and time [Affect] : the affect was normal [Impaired Insight] : insight and judgment were intact

## 2020-07-27 NOTE — HISTORY OF PRESENT ILLNESS
[de-identified] : Patient with GERD X 15 years. Moderate severity. Epigastric pain. buring. worse with eating. Symptoms were diet  controlled for one year and she took no meds. NOw symptoms are worse and she has epigastric gnawing pain all day X 3 months. Only eats one meal a day. + bloating. Now taking Nexium 20 mg qd without reilef. . 2017 EGD showed class A esophagitis, 1 cm hiatal hernia . Biopsies negative ofr shannon's.  ( biopsies were negative for celiac- pt is known celiac from preivous  EGD ). PT tries to stay on gluten free diet , but not strict. Sometimes feels worse is she is strictly gluten free. Breath test negative for SIBO in past.+lactose intolerance.\par    IBS- can get alternating diarrhe and constipation. 2017 colon showed internal hemorhoids . no polyps.+ family hx of colon polyps.

## 2020-08-06 ENCOUNTER — APPOINTMENT (OUTPATIENT)
Dept: PAIN MANAGEMENT | Facility: CLINIC | Age: 50
End: 2020-08-06
Payer: COMMERCIAL

## 2020-08-06 VITALS
BODY MASS INDEX: 36.5 KG/M2 | HEIGHT: 63 IN | WEIGHT: 206 LBS | SYSTOLIC BLOOD PRESSURE: 126 MMHG | HEART RATE: 68 BPM | DIASTOLIC BLOOD PRESSURE: 82 MMHG

## 2020-08-06 VITALS — TEMPERATURE: 97.6 F

## 2020-08-06 PROCEDURE — 99205 OFFICE O/P NEW HI 60 MIN: CPT

## 2020-08-07 NOTE — ASSESSMENT
[FreeTextEntry1] : RSD of the right lower extremity by history following an soft tissue injury.\par In addition to the right foot/ankle pain she describes similar pain in bilateral lateral femoral cutaneous nerve distribution and more recently involving both hands. I am not sure if this represents possible spreading of RSD or distinct pathological entities, ie CTS and meralgia paresthetica.\par \par Agree with MRI of the lumbar spine...performed yesterday NA for my review and EMG/NCV of both upper extremities.\par \par We discussed at length many treat options but will consider them following the workup. I also asked patient to provide me info from prior interventional procedures for my review.\par \par She will also advise her other pain management doc to contact me so we can have a coordinated effort to help relieve her pain.

## 2020-08-07 NOTE — PHYSICAL EXAM
[Affect] : the affect was normal [General Appearance - Alert] : alert [Place] : oriented to place [Person] : oriented to person [Cranial Nerves Oculomotor (III)] : extraocular motion intact [Time] : oriented to time [Cranial Nerves Facial (VII)] : face symmetrical [Motor Tone] : muscle tone was normal in all four extremities [Involuntary Movements] : no involuntary movements were seen [No Muscle Atrophy] : normal bulk in all four extremities [Motor Strength] : muscle strength was normal in all four extremities [Allodynia] : ~T allodynia present [Sclera] : the sclera and conjunctiva were normal [Outer Ear] : the ears and nose were normal in appearance [] : no rash [FreeTextEntry7] : bilateral thigh and plantar surface of both feet.  [FreeTextEntry1] : some glossyness of right > l eft leg. No temperature changes.  [Neck Appearance] : the appearance of the neck was normal

## 2020-08-07 NOTE — HISTORY OF PRESENT ILLNESS
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 Tried to walk it out. Went home on way home, difficulty driving. Ankle blew up. At home treated with w elevation and ice. Next am right foot was black. Went to podiatry -broke foot - placed in cast followed by soft cast after 6 weeks. During exam, she noted significant allodynia. At that time told there was tear of many ligaments but not broken...placed in boot for 3 weeks. The pain was very sensitive to touch,. achy non dysesthetic pain. Then provided a series of cortisone shots with PT. \par \par About 12 weeks after the injury dx with RSD went to see pain medicine. Placed on 3 pain meds but since patient did not take it...decided to leave practice. Ortho eval dx with RSD. Went to see another pain management...Went for anothther pain specialist .. given a series of SAMARIA...transient relioef. One intervention caused severe pain. SCS recommended. \par \par Overall pain is worse. Her right hip pain... and now recently described as bilateral paresthesias along lateral thigh not extending to knee. .\par More recently bilateral pins and needles of both hands\par Lidoderm topically helps to decrease the pain intensity.\par \par  at [facility]

## 2020-08-09 LAB
ALBUMIN SERPL ELPH-MCNC: 4.4 G/DL
ALP BLD-CCNC: 65 U/L
ALT SERPL-CCNC: 18 U/L
ANION GAP SERPL CALC-SCNC: 11 MMOL/L
AST SERPL-CCNC: 16 U/L
BILIRUB SERPL-MCNC: 0.4 MG/DL
BUN SERPL-MCNC: 12 MG/DL
CALCIUM SERPL-MCNC: 9 MG/DL
CHLORIDE SERPL-SCNC: 106 MMOL/L
CO2 SERPL-SCNC: 24 MMOL/L
CREAT SERPL-MCNC: 0.79 MG/DL
GLIADIN IGA SER QL: 130.2 UNITS
GLIADIN IGG SER QL: 108.9 UNITS
GLIADIN PEPTIDE IGA SER-ACNC: POSITIVE
GLIADIN PEPTIDE IGG SER-ACNC: POSITIVE
GLUCOSE SERPL-MCNC: 98 MG/DL
IGA SER QL IEP: 262 MG/DL
POTASSIUM SERPL-SCNC: 4.8 MMOL/L
PROT SERPL-MCNC: 6.7 G/DL
SODIUM SERPL-SCNC: 141 MMOL/L
TSH SERPL-ACNC: 1.39 UIU/ML
TTG IGA SER IA-ACNC: 8.5 U/ML
TTG IGA SER-ACNC: ABNORMAL
TTG IGG SER IA-ACNC: 6 U/ML
TTG IGG SER IA-ACNC: ABNORMAL

## 2020-08-10 LAB
ENDOMYSIUM IGA SER QL: POSITIVE
ENDOMYSIUM IGA TITR SER: ABNORMAL

## 2020-08-13 ENCOUNTER — APPOINTMENT (OUTPATIENT)
Dept: NEUROSURGERY | Facility: CLINIC | Age: 50
End: 2020-08-13
Payer: COMMERCIAL

## 2020-08-13 VITALS
SYSTOLIC BLOOD PRESSURE: 118 MMHG | HEIGHT: 63 IN | WEIGHT: 206 LBS | HEART RATE: 58 BPM | OXYGEN SATURATION: 98 % | DIASTOLIC BLOOD PRESSURE: 78 MMHG | TEMPERATURE: 98.5 F | BODY MASS INDEX: 36.5 KG/M2

## 2020-08-13 PROCEDURE — 99213 OFFICE O/P EST LOW 20 MIN: CPT

## 2020-08-13 NOTE — REVIEW OF SYSTEMS
[As Noted in HPI] : as noted in HPI [Numbness] : numbness [Tingling] : tingling [Negative] : Heme/Lymph

## 2020-08-14 NOTE — PHYSICAL EXAM
[General Appearance - Alert] : alert [General Appearance - In No Acute Distress] : in no acute distress [General Appearance - Well Nourished] : well nourished [General Appearance - Well Developed] : well developed [Oriented To Time, Place, And Person] : oriented to person, place, and time [Impaired Insight] : insight and judgment were intact [Affect] : the affect was normal [Mood] : the mood was normal [Person] : oriented to person [Place] : oriented to place [Time] : oriented to time [Short Term Intact] : short term memory intact [Concentration Intact] : normal concentrating ability [Fluency] : fluency intact [Comprehension] : comprehension intact [Cranial Nerves Optic (II)] : visual acuity intact bilaterally,  pupils equal round and reactive to light [Cranial Nerves Oculomotor (III)] : extraocular motion intact [Cranial Nerves Trigeminal (V)] : facial sensation intact symmetrically [Cranial Nerves Facial (VII)] : face symmetrical [Cranial Nerves Glossopharyngeal (IX)] : tongue and palate midline [Cranial Nerves Accessory (XI - Cranial And Spinal)] : head turning and shoulder shrug symmetric [Cranial Nerves Hypoglossal (XII)] : there was no tongue deviation with protrusion [Motor Tone] : muscle tone was normal in all four extremities [Motor Strength] : muscle strength was normal in all four extremities [Sensation Tactile Decrease] : light touch was intact [Sensation Pain / Temperature Decrease] : pain and temperature was intact [Allodynia] : ~T allodynia present [Abnormal Walk] : normal gait [Balance] : balance was intact [Over the Past 2 Weeks, Have You Felt Down, Depressed, or Hopeless?] : 1.) Over the past 2 weeks, have you felt down, depressed, or hopeless? No [Over the Past 2 Weeks, Have You Felt Little Interest or Pleasure Doing Things?] : 2.) Over the past 2 weeks, have you felt little interest or pleasure doing things? No [Romberg's Sign] : Romberg's sign was negtive [FreeTextEntry6] : LE 5/5 bilaterally [Dysesthesia] : dysesthesia was present

## 2020-08-14 NOTE — ASSESSMENT
[FreeTextEntry1] : Ms. Olivier presents for follow-up and review of her MRI of the LS spine.  The MRI shows relatively mild DDD without severe central or foraminal stenosis.  The patient's complains are not consistent with either radiculopathy or neurogenic claudication.  I have reviewed her most recent pain management evaluation.  I would not recommend neurosurgical intervention.  I would be happy to see the patient back on a prn basis.  She knows to call the office with any new or concerning symptoms at any time.

## 2020-08-14 NOTE — DATA REVIEWED
[de-identified] : MRI of the LS spine was reviewed along with the official report.  See [employer] for full details.

## 2020-12-09 ENCOUNTER — APPOINTMENT (OUTPATIENT)
Dept: PULMONOLOGY | Facility: CLINIC | Age: 50
End: 2020-12-09

## 2021-01-15 ENCOUNTER — APPOINTMENT (OUTPATIENT)
Dept: GASTROENTEROLOGY | Facility: CLINIC | Age: 51
End: 2021-01-15

## 2021-06-08 ENCOUNTER — APPOINTMENT (OUTPATIENT)
Dept: PULMONOLOGY | Facility: CLINIC | Age: 51
End: 2021-06-08

## 2021-07-14 ENCOUNTER — APPOINTMENT (OUTPATIENT)
Dept: PULMONOLOGY | Facility: CLINIC | Age: 51
End: 2021-07-14

## 2022-01-14 ENCOUNTER — APPOINTMENT (OUTPATIENT)
Dept: ORTHOPEDIC SURGERY | Facility: CLINIC | Age: 52
End: 2022-01-14
Payer: COMMERCIAL

## 2022-01-14 VITALS
WEIGHT: 208 LBS | HEART RATE: 79 BPM | BODY MASS INDEX: 38.28 KG/M2 | SYSTOLIC BLOOD PRESSURE: 136 MMHG | DIASTOLIC BLOOD PRESSURE: 90 MMHG | HEIGHT: 62 IN

## 2022-01-14 DIAGNOSIS — S73.192A OTHER SPRAIN OF LEFT HIP, INITIAL ENCOUNTER: ICD-10-CM

## 2022-01-14 DIAGNOSIS — M67.952 UNSPECIFIED DISORDER OF SYNOVIUM AND TENDON, LEFT THIGH: ICD-10-CM

## 2022-01-14 PROCEDURE — 99203 OFFICE O/P NEW LOW 30 MIN: CPT

## 2022-01-14 PROCEDURE — 72100 X-RAY EXAM L-S SPINE 2/3 VWS: CPT

## 2022-01-14 NOTE — DISCUSSION/SUMMARY
[Medication Risks Reviewed] : Medication risks reviewed [de-identified] : We talked about the nature of the condition and treatment options. Anticipatory guidance regarding discogenic pain as well as labral tearing was given. I will provide a prescription for Medrol dose pack for pain relief, patient was educated on the antiinflammatory properties of this medication and how this will help their pain. Patient has been referred to physical therapy for decreased pain modalities, stretching and strengthening modalities, soft tissue modalities, and physical modalities. The patient will follow up in 4 - 6 weeks for a repeat clinical assessment, If pain persists at this point we will consider ordering a lumbar and right hip MRI to further assess these complaints. \par \par During this appointment the patient was examined and diagnoses, counseling, and coordination of care were discussed and explained in a face to face manner for a total time of 30 minutes. Prior to appointment and during encounter with patient extensive medical records were reviewed including but not limited to, hospital records, out patient records, imaging results, and lab data. In addition extensive time was spent reviewing aforementioned diagnostic studies. Counseling included abnormal image results, differential diagnoses, treatment options, risk and benefits, lifestyle changes, current condition, and current medications was performed. Patient's comments, questions, and concerns were address and patient verbalized understanding. Based on this patient's presentation at our office, which is an orthopedic spine surgeon's office, this patient inherently / intrinsically has a risk, however minute, of developing  issues such as Cauda equina syndrome, bowel and bladder changes, or progression of motor or neurological deficits such as paralysis which may be permanent.

## 2022-01-14 NOTE — ADDENDUM
[FreeTextEntry1] : Documented by Sin Alonso acting as a scribe for Cassy Robert on 01/14/2022 . All medical record entries made by the Scribe were at my, Cassy Robert , direction and personally dictated by me on 01/14/2022 . I have reviewed the chart and agree that the record accurately reflects my personal performance of the history, physical exam, assessment and plan. I have also personally directed, reviewed, and agreed with the chart.

## 2022-01-14 NOTE — HISTORY OF PRESENT ILLNESS
[de-identified] : 51 year old F presents for an initial evaluation of lower back pain mainly with positional changes such as standing or getting out of bed, the pain becomes so intense that it takes her breath away. Pain has been present for 3 months and is constant. She notes that her RLE feels weak and putting pants on that leg is specifically difficult. Patient was enrolled in chiropractics but her chiropractor moved out of state. \par \par Seen by Dr. Lora and diagnosed with mild lumbar spondylosis, she was then referred to pain management. \par \par  [Ataxia] : no ataxia [Incontinence] : no incontinence [Loss of Dexterity] : good dexterity [Urinary Ret.] : no urinary retention

## 2022-01-14 NOTE — PHYSICAL EXAM
[Obese] : obese [Poor Appearance] : well-appearing [Acute Distress] : not in acute distress [de-identified] : CONSTITUTIONAL: The patient is a very pleasant individual who is well-nourished and who appears stated age.\par PSYCHIATRIC: The patient is alert and oriented X 3 and in no apparent distress, and participates with orthopedic evaluation well.\par HEAD: Atraumatic and is nonsyndromic in appearance.\par EENT: No visible thyromegaly, EOMI.\par RESPIRATORY: Respiratory rate is regular, not dyspneic on examination.\par LYMPHATICS: There is no inguinal lymphadenopathy\par INTEGUMENTARY: Skin is clean, dry, and intact about the bilateral lower extremities and lumbar spine.\par VASCULAR: There is brisk capillary refill about the bilateral lower extremities.\par NEUROLOGIC: There are no pathologic reflexes. There is no decrease in sensation of the bilateral lower extremities on Wartenberg pinwheel examination. Deep tendon reflexes are well maintained at 2+/4 of the bilateral lower extremities and are symmetric.\par MUSCULOSKELETAL: There is no visible muscular atrophy. Manual motor strength is well maintained in the bilateral lower extremities. + tension sign on the right. Normal secondary orthopaedic exam of ankles. Focal buttock and knee pain with external rotation of the hip on the right. Greater trochanteric bursitis on the right R > L. Mechanical medial knee pain on ROM. Gluteal tendinopathy bilaterally. pain with palpation to the lumbar paravertebral and lower lumbar region. Mechanically oriented lower back pain.  [de-identified] : MRI of the lumbar spine taken at [facility]  demonstrates disc protrusion at L3-L4, L4-L5, and L5-S1. Appears to be stable when compared to an MRI from 2019, as per radiologist dictation. \par \par Xray of the lumbar spine taken 01/14/2022 demonstrates L5-S1 lumbar degenerative disc disease.

## 2022-01-19 ENCOUNTER — APPOINTMENT (OUTPATIENT)
Dept: GASTROENTEROLOGY | Facility: CLINIC | Age: 52
End: 2022-01-19
Payer: COMMERCIAL

## 2022-01-19 VITALS
WEIGHT: 210 LBS | HEART RATE: 78 BPM | BODY MASS INDEX: 38.64 KG/M2 | HEIGHT: 62 IN | TEMPERATURE: 96 F | SYSTOLIC BLOOD PRESSURE: 130 MMHG | DIASTOLIC BLOOD PRESSURE: 82 MMHG

## 2022-01-19 PROCEDURE — 99214 OFFICE O/P EST MOD 30 MIN: CPT

## 2022-01-19 RX ORDER — PANTOPRAZOLE 40 MG/1
40 TABLET, DELAYED RELEASE ORAL DAILY
Qty: 90 | Refills: 1 | Status: COMPLETED | COMMUNITY
Start: 2020-07-27 | End: 2022-01-19

## 2022-01-19 NOTE — REASON FOR VISIT
[Follow-Up: _____] : a [unfilled] follow-up visit [FreeTextEntry1] : dysphagia, GERD, celiac , family hx of colon polyps

## 2022-01-19 NOTE — HISTORY OF PRESENT ILLNESS
[de-identified] : The patient has a history of GERD for 15 years.  In the past she would get intermittent epigastric pain she will take pantoprazole for few months at a time and then stopped for a year at a time until her symptoms recurred.  Currently she is not having heartburn but she has had 2 episodes of severe laryngitis during the fall.  She saw otolaryngologist and was told that her vocal cords were very erythematous.  She has mild dysphagia feels "tightness "in her throat when she has either solids or liquids.  She has no weight loss.  She is taking Nexium 20 mg twice daily.\par    Patient has a history of celiac disease.  She feels severe bloating states that she is only ingesting 1 meal a day.  She states she is "mostly" gluten-free.  She has softer stools.  No rectal bleeding no weight loss.  Mother with colon polyps.  Patient has had a negative SIBO breath test in the past she is lactose intolerant.  EGD in 2017 showed class a esophagitis hiatal hernia and biopsies were negative for Marr's esophagus.  Colonoscopy 2017 showed internal hemorrhoids no polyps

## 2022-01-19 NOTE — PHYSICAL EXAM
[General Appearance - Alert] : alert [General Appearance - In No Acute Distress] : in no acute distress [General Appearance - Well Nourished] : well nourished [Sclera] : the sclera and conjunctiva were normal [] : no respiratory distress [Respiration, Rhythm And Depth] : normal respiratory rhythm and effort [Exaggerated Use Of Accessory Muscles For Inspiration] : no accessory muscle use [Auscultation Breath Sounds / Voice Sounds] : lungs were clear to auscultation bilaterally [Apical Impulse] : the apical impulse was normal [Heart Rate And Rhythm] : heart rate was normal and rhythm regular [Heart Sounds] : normal S1 and S2 [Bowel Sounds] : normal bowel sounds [Abdomen Soft] : soft [Abdomen Tenderness] : non-tender [Skin Color & Pigmentation] : normal skin color and pigmentation [Oriented To Time, Place, And Person] : oriented to person, place, and time [Impaired Insight] : insight and judgment were intact [Affect] : the affect was normal

## 2022-01-19 NOTE — ASSESSMENT
[FreeTextEntry1] : A/P\par The patient is here with mild dysphagia, history of GERD with esophagitis\par Continue Nexium 20 mg twice daily\par Will add Carafate 1 pill twice daily\par \par Patient with celiac.  Not 100% compliant on the gluten-free diet\par Patient is having bloating.\par Check celiac antibodies TSH CBC CMP\par Will try Levsin as needed\par Explained the importance of celiac gluten-free diet.  Needs lactose-free diet as well.  Discussed FOD AMP diet \par We will do EGD to evaluate esophagitis take biopsies for celiac take biopsies for H. pylori\par \par \par Family history of colon polyps we will schedule colonoscopy\par MiraLAX prep\par  I discussed the risks and benefits of EGD and patient was given opportunity to ask questions. EGD to r/o Marr's  esophagus, PUD, mass, AVM'S. Pt is medically optimized for EGD\par  I discussed the risks and benefits of colonoscopy and patient was given opportunity to ask questions. Colonoscopy to r/o colon cancer, polyps, AVM's. Patient is medically optimized for the procedure\par \par

## 2022-01-26 ENCOUNTER — LABORATORY RESULT (OUTPATIENT)
Age: 52
End: 2022-01-26

## 2022-01-26 LAB
BASOPHILS # BLD AUTO: 0.07 K/UL
BASOPHILS NFR BLD AUTO: 0.9 %
EOSINOPHIL # BLD AUTO: 0.19 K/UL
EOSINOPHIL NFR BLD AUTO: 2.3 %
HCT VFR BLD CALC: 44.4 %
HGB BLD-MCNC: 14.3 G/DL
IMM GRANULOCYTES NFR BLD AUTO: 0.6 %
LYMPHOCYTES # BLD AUTO: 2.13 K/UL
LYMPHOCYTES NFR BLD AUTO: 26.1 %
MAN DIFF?: NORMAL
MCHC RBC-ENTMCNC: 25.9 PG
MCHC RBC-ENTMCNC: 32.2 GM/DL
MCV RBC AUTO: 80.4 FL
MONOCYTES # BLD AUTO: 0.78 K/UL
MONOCYTES NFR BLD AUTO: 9.6 %
NEUTROPHILS # BLD AUTO: 4.93 K/UL
NEUTROPHILS NFR BLD AUTO: 60.5 %
PLATELET # BLD AUTO: 307 K/UL
RBC # BLD: 5.52 M/UL
RBC # FLD: 14 %
WBC # FLD AUTO: 8.15 K/UL

## 2022-01-27 LAB
ALBUMIN SERPL ELPH-MCNC: 4.4 G/DL
ALP BLD-CCNC: 97 U/L
ALT SERPL-CCNC: 27 U/L
ANION GAP SERPL CALC-SCNC: 15 MMOL/L
AST SERPL-CCNC: 18 U/L
BILIRUB SERPL-MCNC: 0.4 MG/DL
BUN SERPL-MCNC: 12 MG/DL
CALCIUM SERPL-MCNC: 9.3 MG/DL
CHLORIDE SERPL-SCNC: 104 MMOL/L
CO2 SERPL-SCNC: 23 MMOL/L
CREAT SERPL-MCNC: 0.73 MG/DL
GLUCOSE SERPL-MCNC: 101 MG/DL
POTASSIUM SERPL-SCNC: 4.4 MMOL/L
PROT SERPL-MCNC: 7 G/DL
SODIUM SERPL-SCNC: 142 MMOL/L
TSH SERPL-ACNC: 1.57 UIU/ML

## 2022-01-31 LAB — CELIACPAN: NORMAL

## 2022-02-15 ENCOUNTER — APPOINTMENT (OUTPATIENT)
Dept: ORTHOPEDIC SURGERY | Facility: CLINIC | Age: 52
End: 2022-02-15
Payer: COMMERCIAL

## 2022-02-15 VITALS
HEIGHT: 62 IN | HEART RATE: 63 BPM | WEIGHT: 210 LBS | SYSTOLIC BLOOD PRESSURE: 125 MMHG | DIASTOLIC BLOOD PRESSURE: 74 MMHG | BODY MASS INDEX: 38.64 KG/M2

## 2022-02-15 DIAGNOSIS — M70.61 TROCHANTERIC BURSITIS, RIGHT HIP: ICD-10-CM

## 2022-02-15 PROCEDURE — 99213 OFFICE O/P EST LOW 20 MIN: CPT

## 2022-03-16 ENCOUNTER — APPOINTMENT (OUTPATIENT)
Dept: ORTHOPEDIC SURGERY | Facility: CLINIC | Age: 52
End: 2022-03-16
Payer: COMMERCIAL

## 2022-03-16 VITALS
BODY MASS INDEX: 38.64 KG/M2 | DIASTOLIC BLOOD PRESSURE: 76 MMHG | HEIGHT: 62 IN | HEART RATE: 46 BPM | SYSTOLIC BLOOD PRESSURE: 141 MMHG | WEIGHT: 210 LBS

## 2022-03-16 DIAGNOSIS — M70.62 TROCHANTERIC BURSITIS, RIGHT HIP: ICD-10-CM

## 2022-03-16 DIAGNOSIS — M70.61 TROCHANTERIC BURSITIS, RIGHT HIP: ICD-10-CM

## 2022-03-16 DIAGNOSIS — M25.559 PAIN IN UNSPECIFIED HIP: ICD-10-CM

## 2022-03-16 PROCEDURE — 73522 X-RAY EXAM HIPS BI 3-4 VIEWS: CPT

## 2022-03-16 PROCEDURE — 99215 OFFICE O/P EST HI 40 MIN: CPT

## 2022-03-16 NOTE — HISTORY OF PRESENT ILLNESS
[de-identified] : Ms. NEWSOME is a pleasant 51 year old female who presents with Right Hip pain. \par Patient is a 51-year-old female presented with a chief complaint of bilateral hip pain right worse than left has been going on for many years.  Patient states that prior to Covid she had an injection in the lateral aspect of her right hip that helped relieve the pain in the hip.  States that she is been having increasing pain in her lower back as well as numbness radiating into her right lower extremity.  States that she feels the pain now in her groin on the right side as well.  Hurts to lay on the right side.  Has severe tenderness to touch on the right and left side lateral sides of her hips.  Has been in pain management as well as seeing a spine surgeon for her lumbar spine.  Denies any bowel or bladder issues or gait disturbances.  States that she feels like the right leg does not bend like the other one and she is having difficulties beginning to put on shoes and cross her legs.  Is not taking any pain medications at this time.  Of note she does have a history of asthma and has taken steroids in the past as well as for her recent Covid infection approximate 1 year ago.

## 2022-03-16 NOTE — DISCUSSION/SUMMARY
[Medication Risks Reviewed] : Medication risks reviewed [Surgical risks reviewed] : Surgical risks reviewed [de-identified] : Patient is a 51-year-old female with bilateral hip greater trochanteric bursitis presenting today for initial evaluation.  Of note she is now having pain in her right groin as well as reduced range of motion in her right hip.  I do not see anything on the x-ray that would correlate with these findings however she does have mechanical pain on her physical exam.  I therefore recommended an MRI of her right hip for further evaluation to rule out AVN as she does have a history of steroid use due to Covid infection as well has her asthma.  I will see her back after the MRI for repeat evaluation and management.  We did discuss that she may be a candidate for cortisone injections into her bilateral trochanteric bursa depending on the results of her MRI.  Patient is in agreement this.  She should take NSAIDs as needed for the pain.  We will also begin a course of physical therapy depending on the results of her MRI.  I will see her back after the MRI for repeat evaluation.

## 2022-03-16 NOTE — PHYSICAL EXAM
[de-identified] : GENERAL APPEARANCE: Well nourished and hydrated, pleasant, alert, and oriented x 3. Appears their stated age. \par HEENT: Normocephalic, extraocular eye motion intact. Nasal septum midline. Oral cavity clear. External auditory canal clear. \par RESPIRATORY: Breath sounds clear and audible in all lobes. No wheezing, No accessory muscle use.\par CARDIOVASCULAR: No apparent abnormalities. No lower leg edema. No varicosities. Pedal pulses are palpable.\par NEUROLOGIC: Sensation is normal, no muscle weakness in the upper or lower extremities.\par DERMATOLOGIC: No apparent skin lesions, moist, warm, no rash.\par SPINE: Cervical spine appears normal and moves freely; thoracic spine appears normal and moves freely; lumbosacral spine appears normal and moves freely, normal, nontender.\par MUSCULOSKELETAL: Hands, wrists, and elbows are normal and move freely, shoulders are normal and move freely. \par Psychiatric: Oriented to person, place, and time, insight and judgement were intact and the affect was normal. \par Musculoskeletal: ambulates normally. Right hip exam showed mild groin pain with SLR, ROM is full with pain at extremes of internal and external rotation, HORACE negative, FADIR negative.  There is tenderness palpation over the right greater trochanter.\par 5/5 motor strength in bilateral lower extremities. Sensory: Intact in bilateral lower extremities. DTRs: Biceps, brachioradialis, triceps, patellar, ankle and plantar 2+ and symmetric bilaterally. Pulses: dorsalis pedis, posterior tibial, femoral, popliteal, and radial 2+ and symmetric bilaterally. \par Musculoskeletal: ambulates normally. Left hip exam showed no groin pain with SLR, ROM is full without pain, HORACE negative, FADIR negative.  There is tenderness palpation over left greater trochanter.\par 5/5 motor strength in bilateral lower extremities. Sensory: Intact in bilateral lower extremities. DTRs: Biceps, brachioradialis, triceps, patellar, ankle and plantar 2+ and symmetric bilaterally. Pulses: dorsalis pedis, posterior tibial, femoral, popliteal, and radial 2+ and symmetric bilaterally. \par  [de-identified] : AP pelvis and 2 views of bilateral hips obtained in the office today show no acute fracture or dislocation.  No significant degenerative changes noted.

## 2022-03-18 ENCOUNTER — LABORATORY RESULT (OUTPATIENT)
Age: 52
End: 2022-03-18

## 2022-03-20 ENCOUNTER — TRANSCRIPTION ENCOUNTER (OUTPATIENT)
Age: 52
End: 2022-03-20

## 2022-03-21 ENCOUNTER — RESULT REVIEW (OUTPATIENT)
Age: 52
End: 2022-03-21

## 2022-03-21 ENCOUNTER — OUTPATIENT (OUTPATIENT)
Dept: OUTPATIENT SERVICES | Facility: HOSPITAL | Age: 52
LOS: 1 days | End: 2022-03-21
Payer: COMMERCIAL

## 2022-03-21 ENCOUNTER — APPOINTMENT (OUTPATIENT)
Dept: GASTROENTEROLOGY | Facility: GI CENTER | Age: 52
End: 2022-03-21
Payer: COMMERCIAL

## 2022-03-21 DIAGNOSIS — Z83.71 FAMILY HISTORY OF COLONIC POLYPS: ICD-10-CM

## 2022-03-21 DIAGNOSIS — K21.9 GASTRO-ESOPHAGEAL REFLUX DISEASE WITHOUT ESOPHAGITIS: ICD-10-CM

## 2022-03-21 PROCEDURE — 45378 DIAGNOSTIC COLONOSCOPY: CPT | Mod: PT

## 2022-03-21 PROCEDURE — G0121: CPT

## 2022-03-21 PROCEDURE — 43239 EGD BIOPSY SINGLE/MULTIPLE: CPT

## 2022-03-21 PROCEDURE — 88342 IMHCHEM/IMCYTCHM 1ST ANTB: CPT | Mod: 26

## 2022-03-21 PROCEDURE — 43239 EGD BIOPSY SINGLE/MULTIPLE: CPT | Mod: 59

## 2022-03-21 PROCEDURE — 88305 TISSUE EXAM BY PATHOLOGIST: CPT | Mod: 26

## 2022-03-21 PROCEDURE — 88305 TISSUE EXAM BY PATHOLOGIST: CPT

## 2022-03-21 PROCEDURE — 88342 IMHCHEM/IMCYTCHM 1ST ANTB: CPT

## 2022-03-21 NOTE — ASSESSMENT
[FreeTextEntry1] : A/P\par \par gerd, celiac bloating\par  I discussed the risks and benefits of EGD and patient was given opportunity to ask questions. EGD to r/o Marr's  esophagus, PUD, mass, AVM'S. Pt is medically optimized for EGD\par \par \par \par family hx of colon polyps\par  I discussed the risks and benefits of colonoscopy and patient was given opportunity to ask questions. Colonoscopy to r/o colon cancer, polyps, AVM's. Patient is medically optimized for the procedure\par

## 2022-03-21 NOTE — REASON FOR VISIT
[Follow-Up: _____] : a [unfilled] follow-up visit [FreeTextEntry1] : bloating, celiac , GERD, family hx of colon polyps

## 2022-03-24 LAB — SURGICAL PATHOLOGY STUDY: SIGNIFICANT CHANGE UP

## 2022-04-20 NOTE — ED PROVIDER NOTE - HEME/LYMPH NEGATIVE STATEMENT, MLM
Janki Casanova (: 1957) is a 72 y.o. female, patient, here for evaluation of the following chief complaint(s):  Neck Pain (Cervical and Thoracic MRIs 22)        ASSESSMENT/PLAN:     Below is the assessment and plan developed based on review of pertinent history, physical exam, labs, studies, and medications.     At this point she has severe cervical stenosis at C3-4 and C4-5. She has cord changes. She has developed significant myelopathy in the last couple of months. She is a candidate for a ACDF at C3-4 and C4-5 and possibly C5-6. Risk benefits were discussed with her. She would like to proceed.     The risks and benefits were discussed at length with the patient and the patient has elected to proceed. Indications for surgery include failed conservative treatment. Alternative treatments, risks and the perioperative course were discussed with the patient. All questions were answered. The risks and benefits of the procedure were explained. Benefits include definitive diagnosis, relief of pain, elimination of deformity and improved function. Risks of surgery including bleeding, infection, weakness, numbness, CSF leak, failure to improve symptoms, exacerbation of medical co-morbidities and even death were discussed with the patient.     1. Chronic neck pain  -     XR SPINE CERV 4 OR 5 V; Future  2. Cervical disc disease  3. Cervical stenosis of spinal canal        No follow-ups on file.       Date of Surgery Update:  Janki Sy was seen and examined. History and physical has been reviewed. The patient has been examined.  There have been no significant clinical changes since the completion of the originally dated History and Physical.    Signed By: Brandon Alexis MD     2022 7:56 AM              SUBJECTIVE/OBJECTIVE:  Angel Elizabeth (: 1957) is a 72 y.o. female.     No flowsheet data found.      The patient comes in today on referral for chronic neck pain and bilateral leg weakness. She has been struggling over the last several months with increasing difficulty with walking and standing. She is also having some bladder issues. Neurology work-up is revealed severe cord compression in the neck. She has to ambulate now with a rolling walker. She denies any other new bowel issues. She been sent here for surgical consultation        Imaging:     XR Results (most recent):  Results from Appointment encounter on 04/05/22     XR SPINE CERV 4 OR 5 V     Narrative  AP and lateral flexion-extension cervical spine reviewed today. There is cervical spondylosis predominate C5-6 with protuberant anterior osteophyte formation. Moderate spondylosis C6-7. No fractures or lytic lesions              MRI Results (most recent):     I reviewed the most recent MRI of her cervical spine. She has. Spondylosis C5-6. She has spondylosis C3-4 C4-5 with severe cord compression with cord changes particularly behind C4-5.        No Known Allergies          Current Outpatient Medications   Medication Sig    cephALEXin (Keflex) 500 mg capsule Take 1 Capsule by mouth four (4) times daily for 7 days.  ibuprofen (MOTRIN) 600 mg tablet Take 1 Tablet by mouth every six (6) hours as needed for Pain.  cyanocobalamin 1,000 mcg tablet Take 1 Tablet by mouth daily for 90 days. Low B12 level. Indications: prevention of vitamin B12 deficiency    lactulose (CHRONULAC) 10 gram/15 mL solution TAKE 30 MILLILITERS BY MOUTH once daily    cinacalcet (SENSIPAR) 30 mg tablet Take 30 mg by mouth daily.  amLODIPine (NORVASC) 5 mg tablet Take 10 mg by mouth daily.     OLANZapine (ZYPREXA) 20 mg tablet Take 10 mg by mouth nightly.      No current facility-administered medications for this visit.              Past Medical History:   Diagnosis Date    Hypertension      Psychotic disorder ([address]       Schizophrenia               Past Surgical History:   Procedure Laterality Date    HX COLONOSCOPY        HX OTHER SURGICAL         Ingrown toenail removal               Family History   Problem Relation Age of Onset    No Known Problems Mother      No Known Problems Father           Social History            Tobacco Use    Smoking status: Current Every Day Smoker       Packs/day: 0.50       Years: 45.00       Pack years: 22.50    Smokeless tobacco: Never Used   Vaping Use    Vaping Use: Never used   Substance Use Topics    Alcohol use: Not Currently    Drug use: Never         Review of Systems        Vitals:  Ht 5' 7\" (1.702 m)   Wt 180 lb (81.6 kg)   BMI 28.19 kg/m²    Body mass index is 28.19 kg/m².     Ortho Exam         Alert and Ponder  x 3     Normal gait and station; normal posture     No assistive devices today.     Lumbar spine:  Examination of the lumbar spine demonstrates no tenderness on palpation, no pain, no swelling or edema, with normal lumbar range of motion.     Thoracic spine: Examination of the thoracic spine demonstrates no tenderness on palpation, no pain, no swelling or edema. Normal sensation and range of motion. Cervical spine:      Examination of the cervical spine demonstrates on inspection: No abnormal cutaneous markings. No step-offs noted. No previous surgical incisions. .     On palpation: Minimal tenderness     Range of motion: Normal range of motion although slow.     Motor examination:  Right deltoid 5/5,  left deltoid 5/5, right bicep 5/5, left bicep 5/5, right wrist extensor 5/5/, left wrist extensor 5/5, right triceps 5/5, left triceps 5/5, right intrinsics 5/5, left intrinsics     Sensory examination: Reveals no deficits.     Reflexes: Left bicep 2/2, left bicep 2/2, right triceps 2/2, left triceps 2/2     Functional testing: Spurling's exam is positive bilaterally; upper limb tension test is positive bilaterally     Barros's signs hyperreflexic and positive bilaterally.               An electronic signature was used to authenticate this note.   -- Heriberto Swenson, MD no anemia, no easy bruising, no jaundice, no swollen lymph nodes.

## 2022-04-26 ENCOUNTER — OUTPATIENT (OUTPATIENT)
Dept: OUTPATIENT SERVICES | Facility: HOSPITAL | Age: 52
LOS: 1 days | End: 2022-04-26
Payer: COMMERCIAL

## 2022-04-26 DIAGNOSIS — R13.10 DYSPHAGIA, UNSPECIFIED: ICD-10-CM

## 2022-04-26 PROCEDURE — 74230 X-RAY XM SWLNG FUNCJ C+: CPT | Mod: 26

## 2022-04-26 PROCEDURE — 92611 MOTION FLUOROSCOPY/SWALLOW: CPT

## 2022-04-26 PROCEDURE — 74230 X-RAY XM SWLNG FUNCJ C+: CPT

## 2022-04-26 NOTE — SWALLOW VFSS/MBS ASSESSMENT ADULT - ROSENBEK'S PENETRATION ASPIRATION SCALE
(1) no aspiration, contrast does not enter airway inconsistent penetration/aspiration scale score: 2: penetration with complete & spontaneous retrieval/(1) no aspiration, contrast does not enter airway inconsistent penetration/aspiration scale score: 2: penetration with complete & spontaneous retrieval (in 3/8 trials)/(1) no aspiration, contrast does not enter airway

## 2022-04-26 NOTE — SWALLOW VFSS/MBS ASSESSMENT ADULT - SLP PERTINENT HISTORY OF CURRENT PROBLEM
Upon ?, pt reported: "I feel like I can't catch my breath in the middle of swallowing", "sometimes I cough/choke. Pt reported this to occur 0-4 times daily with PO intake

## 2022-04-26 NOTE — SWALLOW VFSS/MBS ASSESSMENT ADULT - RECOMMENDED FEEDING/EATING TECHNIQUES
allow for swallow between intakes/position upright (90 degrees)/provide rest periods between swallows/small sips/bites

## 2022-04-26 NOTE — SWALLOW VFSS/MBS ASSESSMENT ADULT - SLP GENERAL OBSERVATIONS
Pt seen seated upright in chair, oriented, pleasant/cooperative, mildly hoarse vocal quality, baseline throat clearing noted. Pt reported she was "nervous" for test: encouragement/support provided as needed

## 2022-04-26 NOTE — SWALLOW VFSS/MBS ASSESSMENT ADULT - PHARYNGEAL PHASE COMMENTS
throat clearing & delayed cough noted post swallow: no PO in airway Pt with ongoing cough post trials, no PO observed in airway

## 2022-04-26 NOTE — SWALLOW VFSS/MBS ASSESSMENT ADULT - COMMENTS
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 Additionally, pt reported she has been receiving voice tx. Upon ?, pt reported she had COVID in April 2021, in which she had a "a lot of coughing".  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 Pt was unable to recall which vocal cord is paralyzed & that she is scheduled for a "procedure with Botox" in early May. Additionally, pt reported she has been receiving voice tx.

## 2022-04-26 NOTE — SWALLOW VFSS/MBS ASSESSMENT ADULT - DIAGNOSTIC IMPRESSIONS
Reduced lingual coordination with resultant premature loss with thin fluids (? impacted by pt reported "nervousness t/o study), however, oral stage of swallow overall WFL for assessed PO. Pharyngeal stage of swallow functional for puree, regular solids & thin fluids. Inconsistent trace penetration with complete & spontaneous retrieval noted with intake of thin fluids. Pt with coughing/throat clearing to study which was NOT related to PO in airway

## 2022-07-22 ENCOUNTER — APPOINTMENT (OUTPATIENT)
Dept: ORTHOPEDIC SURGERY | Facility: CLINIC | Age: 52
End: 2022-07-22

## 2022-07-22 VITALS
SYSTOLIC BLOOD PRESSURE: 129 MMHG | DIASTOLIC BLOOD PRESSURE: 79 MMHG | HEART RATE: 51 BPM | HEIGHT: 62 IN | BODY MASS INDEX: 37.73 KG/M2 | WEIGHT: 205 LBS

## 2022-07-22 DIAGNOSIS — M47.812 SPONDYLOSIS W/OUT MYELOPATHY OR RADICULOPATHY, CERVICAL REGION: ICD-10-CM

## 2022-07-22 DIAGNOSIS — M60.9 MYOSITIS, UNSPECIFIED: ICD-10-CM

## 2022-07-22 DIAGNOSIS — M51.9 UNSPECIFIED THORACIC, THORACOLUMBAR AND LUMBOSACRAL INTERVERTEBRAL DISC DISORDER: ICD-10-CM

## 2022-07-22 DIAGNOSIS — E66.01 MORBID (SEVERE) OBESITY DUE TO EXCESS CALORIES: ICD-10-CM

## 2022-07-22 PROCEDURE — 99213 OFFICE O/P EST LOW 20 MIN: CPT

## 2022-07-22 NOTE — PHYSICAL EXAM
[Obese] : obese [Poor Appearance] : well-appearing [Acute Distress] : not in acute distress [de-identified] : CONSTITUTIONAL: The patient is a very pleasant individual who is well-nourished and who appears stated age.\par PSYCHIATRIC: The patient is alert and oriented X 3 and in no apparent distress, and participates with orthopedic evaluation well.\par HEAD: Atraumatic and is nonsyndromic in appearance.\par EENT: No visible thyromegaly, EOMI.\par RESPIRATORY: Respiratory rate is regular, not dyspneic on examination.\par LYMPHATICS: There is no inguinal lymphadenopathy\par INTEGUMENTARY: Skin is clean, dry, and intact about the bilateral lower extremities and lumbar spine.\par VASCULAR: There is brisk capillary refill about the bilateral lower extremities.\par NEUROLOGIC: There are no pathologic reflexes. There is no decrease in sensation of the bilateral lower extremities on Wartenberg pinwheel examination. Deep tendon reflexes are well maintained at 2+/4 of the bilateral lower extremities and are symmetric.\par MUSCULOSKELETAL: There is no visible muscular atrophy. Manual motor strength is well maintained in the bilateral lower extremities. + tension sign on the right. Normal secondary orthopaedic exam of ankles. Focal buttock and knee pain with external rotation of the hip on the right. Greater trochanteric bursitis on the right R > L. Mechanical medial knee pain on ROM. Gluteal tendinopathy bilaterally. pain with palpation to the lumbar paravertebral and lower lumbar region. Much improved mechanically oriented lower back pain.  [de-identified] : MRI of the lumbar spine taken at [facility] on  demonstrates disc protrusion at L3-L4, L4-L5, and L5-S1. Appears to be stable when compared to an MRI from 2019, as per radiologist dictation. \par \par Xray of the lumbar spine taken 01/14/2022 demonstrates L5-S1 lumbar degenerative disc disease.

## 2022-07-22 NOTE — DISCUSSION/SUMMARY
[de-identified] : We talked about the nature of the condition and treatment options. Anticipatory guidance regarding gluteal tendinopathy and greater trochanteric bursitis was given. Patient has been referred back to physical therapy for decreased pain modalities, greater trochanteric bursitis modalities, stretching and strengthening modalities, soft tissue modalities, and physical modalities. Patient was provided a Rx for Naproxen Sodium 500Mg BID. Patient to follow up on a PRN basis. \par \par Prior to appointment and during encounter with patient extensive medical records were reviewed including but not limited to, hospital records, out patient records, imaging results, and lab data. During this appointment the patient was examined, diagnoses were discussed and explained in a face to face manner. In addition extensive time was spent reviewing aforementioned diagnostic studies. Counseling including abnormal image results, differential diagnoses, treatment options, risk and benefits, lifestyle changes, current condition, and current medications was performed. Patient's comments, questions, and concerns were address and patient verbalized understanding. Based on this patient's presentation at our office, which is an orthopedic spine surgeon's office, this patient inherently / intrinsically has a risk, however minute, of developing  issues such as Cauda equina syndrome, bowel and bladder changes, or progression of motor or neurological deficits such as paralysis which may be permanent.

## 2022-07-22 NOTE — ADDENDUM
[FreeTextEntry1] : Documented by Sin Alonso acting as a scribe for Dr. Santos Castillo on 02/15/2022. All medical record entries made by the Scribe were at my, Dr. Santos Castillo, direction and personally dictated by me on 02/15/2022 . I have reviewed the chart and agree that the record accurately reflects my personal performance of the history, physical exam, assessment and plan. I have also personally directed, reviewed, and agreed with the chart.

## 2022-07-22 NOTE — HISTORY OF PRESENT ILLNESS
[de-identified] : Patient presents was for an acute 2 to 3-week period of worsening cervical pain this appears to be a cervical sprain and strain she states she is having some post COVID symptoms been referred to ENT ENT and physical therapy been working at this moment she states during physical therapy exercises she noticed an acute worsening of left paraspinal cervical pain as well as pain over the posterior aspect of the distal aspect of the cervical spine over the C6-7 and T1 spinous processes.

## 2022-07-22 NOTE — PHYSICAL EXAM
[de-identified] : CONSTITUTIONAL:  Patient is a very pleasant individual who is well-nourished and appears stated age. \par PSYCHIATRIC:  Alert and oriented times three and in no apparent distress, and participates with orthopedic evaluation well.\par HEAD:  Atraumatic and  nonsyndromic in appearance.\par EENT: No thyromegaly, EOMI.\par RESPIRATORY:  Respiratory rate is regular, not dyspneic on examination.\par LYMPHATICS:  There is no cervical or axillary lymphadenopathy.\par INTEGUMENTARY:  Skin is clean, dry, and intact about the bilateral upper extremities and cervical spine. \par VASCULAR:   There is brisk capillary refill about the bilateral upper extremities and radial pulses are 2/4. \par NEUROLOGIC:  Negative L'hirmitte, negative Spurling's sign. There are no pathologic reflexes. There is no decrease in sensation of the bilateral upper extremities on Wartenberg pinwheel examination.  Deep tendon reflexes are well-maintained at +2/4 of the bilateral upper extremities and are symmetric.\par MUSCULOSKELETAL:  There is no visible muscular atrophy.  Manual motor strength is well maintained in the bilateral upper extremities.  Cervical range of motion is well maintained.  The patient ambulates in a non-myelopathic manner. Normal secondary orthopaedic exam of bilateral shoulders, elbows and hands.  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 Appears to be stable when compared to an MRI from 2019, as per radiologist dictation. \par \par Xray of the lumbar spine taken 01/14/2022 demonstrates L5-S1 lumbar degenerative disc disease.

## 2022-07-22 NOTE — REASON FOR VISIT
[Initial Visit] : an initial visit for [FreeTextEntry2] : neck pain patient had surgery in her vocal cords 5/16/22

## 2022-07-22 NOTE — DISCUSSION/SUMMARY
[de-identified] : I think this is consistent primarily with an acute cervical sprain and strain she has been referred back to physical therapy for soft tissue modalities only placed on a short course of steroids and naproxen sodium if physical therapy does not fail to control this within approximately 4 to 6 weeks I would recommend a cervical MRI for further evaluation

## 2022-07-22 NOTE — HISTORY OF PRESENT ILLNESS
[1] : a current pain level of 1/10 [de-identified] : 51 year old F Presents for follow up evaluation of an acute exacerbation of chronic lower back pain mainly with positional changes such as standing or getting out of bed, the pain became so intense that it took her breath away. Pain has been present for 4 months and was constant. She states since starting PT at last visit her lower back feels much improved. She still has pain and numbness in the right lateral thigh. She notes that PT focuses on the back and not the thigh. \par \par Seen by Dr. Lora and diagnosed with mild lumbar spondylosis, she was then referred to pain management. \par \par  [Ataxia] : no ataxia [Incontinence] : no incontinence [Loss of Dexterity] : good dexterity [Urinary Ret.] : no urinary retention

## 2022-09-16 ENCOUNTER — APPOINTMENT (OUTPATIENT)
Dept: NEUROLOGY | Facility: CLINIC | Age: 52
End: 2022-09-16
Payer: COMMERCIAL

## 2022-09-16 VITALS
HEIGHT: 63 IN | DIASTOLIC BLOOD PRESSURE: 74 MMHG | SYSTOLIC BLOOD PRESSURE: 118 MMHG | WEIGHT: 207 LBS | BODY MASS INDEX: 36.68 KG/M2

## 2022-09-16 DIAGNOSIS — R13.10 DYSPHAGIA, UNSPECIFIED: ICD-10-CM

## 2022-09-16 DIAGNOSIS — R49.0 DYSPHONIA: ICD-10-CM

## 2022-09-16 PROCEDURE — 99204 OFFICE O/P NEW MOD 45 MIN: CPT

## 2022-09-16 PROCEDURE — 99214 OFFICE O/P EST MOD 30 MIN: CPT

## 2022-09-16 RX ORDER — NAPROXEN 500 MG/1
500 TABLET ORAL
Qty: 60 | Refills: 1 | Status: COMPLETED | COMMUNITY
Start: 2022-07-22 | End: 2022-09-16

## 2022-09-16 RX ORDER — NAPROXEN 500 MG/1
500 TABLET ORAL
Qty: 60 | Refills: 1 | Status: COMPLETED | COMMUNITY
Start: 2022-02-15 | End: 2022-09-16

## 2022-09-16 NOTE — ASSESSMENT
[FreeTextEntry1] : Her neurological examination is entirely normal\par I am suggesting a brain MRI to see if there are any possible central etiologies for her speech and swallowing issues

## 2022-09-16 NOTE — HISTORY OF PRESENT ILLNESS
[FreeTextEntry1] : She reports that November 2021 she lost her voice for about a week.  She recovered slowly.  In May of this year she underwent some sort of procedure done by an ENT specialist which helped  She reports some mild swallowing difficulties.  Says she had a swallow evaluation which reported some difficulty propelling the food backwards.  She seems to be able to swallow liquids and solids for the most part.  She says she has some rare choking.  2 weeks ago she lost her voice again for 2 days.  Recovered completely.\par \par Denies any weakness or numbness of the arms or legs.  Denies any ptosis.  Denies any visual disturbance.\par \par Medical history otherwise significant for hypertension\par \par She did see a neurologist April 2021 because of some trouble walking post COVID.  She did recover completely from that.

## 2022-09-16 NOTE — CONSULT LETTER
[Dear  ___] : Dear  [unfilled], [Consult Letter:] : I had the pleasure of evaluating your patient, [unfilled]. [Please see my note below.] : Please see my note below. [Consult Closing:] : Thank you very much for allowing me to participate in the care of this patient.  If you have any questions, please do not hesitate to contact me. [Sincerely,] : Sincerely, [FreeTextEntry3] : Santos Grimes MD, PhD, DPN-N\par [facility]\par Neurology at [place]\par Chief, Division of Neurology\par [facility]\par

## 2022-12-14 ENCOUNTER — APPOINTMENT (OUTPATIENT)
Dept: PULMONOLOGY | Facility: CLINIC | Age: 52
End: 2022-12-14

## 2023-01-24 ENCOUNTER — APPOINTMENT (OUTPATIENT)
Dept: ORTHOPEDIC SURGERY | Facility: CLINIC | Age: 53
End: 2023-01-24

## 2023-05-17 ENCOUNTER — EMERGENCY (EMERGENCY)
Facility: HOSPITAL | Age: 53
LOS: 1 days | Discharge: HOME | End: 2023-05-17
Attending: EMERGENCY MEDICINE
Payer: COMMERCIAL

## 2023-05-17 VITALS
HEART RATE: 82 BPM | TEMPERATURE: 99 F | SYSTOLIC BLOOD PRESSURE: 124 MMHG | RESPIRATION RATE: 16 BRPM | WEIGHT: 210.1 LBS | DIASTOLIC BLOOD PRESSURE: 84 MMHG | OXYGEN SATURATION: 95 % | HEIGHT: 63 IN

## 2023-05-17 PROCEDURE — 73070 X-RAY EXAM OF ELBOW: CPT | Mod: 26,RT

## 2023-05-17 PROCEDURE — 71101 X-RAY EXAM UNILAT RIBS/CHEST: CPT | Mod: 26

## 2023-05-17 PROCEDURE — 99284 EMERGENCY DEPT VISIT MOD MDM: CPT

## 2023-05-17 PROCEDURE — 73090 X-RAY EXAM OF FOREARM: CPT | Mod: 26,LT

## 2023-05-17 PROCEDURE — 73070 X-RAY EXAM OF ELBOW: CPT

## 2023-05-17 PROCEDURE — 71101 X-RAY EXAM UNILAT RIBS/CHEST: CPT

## 2023-05-17 PROCEDURE — 73090 X-RAY EXAM OF FOREARM: CPT

## 2023-05-17 RX ORDER — IBUPROFEN 200 MG
600 TABLET ORAL ONCE
Refills: 0 | Status: COMPLETED | OUTPATIENT
Start: 2023-05-17 | End: 2023-05-17

## 2023-05-17 RX ORDER — DIAZEPAM 5 MG
2 TABLET ORAL ONCE
Refills: 0 | Status: DISCONTINUED | OUTPATIENT
Start: 2023-05-17 | End: 2023-05-17

## 2023-05-17 RX ADMIN — Medication 600 MILLIGRAM(S): at 21:25

## 2023-05-17 RX ADMIN — Medication 2 MILLIGRAM(S): at 21:25

## 2023-05-17 NOTE — ED STATDOCS - PATIENT PORTAL LINK FT
You can access the FollowMyHealth Patient Portal offered by [facility] by registering at the following website: [email or web address]. By joining [employer]’s FollowMyHealth portal, you will also be able to view your health information using other applications (apps) compatible with our system.
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
## 2023-05-17 NOTE — ED STATDOCS - MUSCULOSKELETAL, MLM
Trapezius tenderness. No rashad deformity in elbow. No swelling.  Pain with pronation and supination. Last rib on the right side of chest with tenderness.

## 2023-05-17 NOTE — ED STATDOCS - ATTENDING APP SHARED VISIT CONTRIBUTION OF CARE
I, Joyce Lerma, performed the initial face to face bedside interview with this patient regarding history of present illness, review of symptoms and relevant past medical, social and family history.  I completed an independent physical examination.  I was the initial provider who evaluated this patient. I have signed out the follow up of any pending tests (i.e. labs, radiological studies) to the ACP.  I have communicated the patient’s plan of care and disposition with the ACP.  The history, relevant review of systems, past medical and surgical history, medical decision making, and physical examination was documented by the scribe in my presence and I attest to the accuracy of the documentation.

## 2023-05-31 ENCOUNTER — APPOINTMENT (OUTPATIENT)
Dept: PULMONOLOGY | Facility: CLINIC | Age: 53
End: 2023-05-31

## 2023-06-28 ENCOUNTER — OFFICE (OUTPATIENT)
Dept: URBAN - METROPOLITAN AREA CLINIC 115 | Facility: CLINIC | Age: 53
Setting detail: OPHTHALMOLOGY
End: 2023-06-28
Payer: COMMERCIAL

## 2023-06-28 DIAGNOSIS — B30.9: ICD-10-CM

## 2023-06-28 PROCEDURE — 92012 INTRM OPH EXAM EST PATIENT: CPT | Performed by: OPTOMETRIST

## 2023-06-28 ASSESSMENT — REFRACTION_CURRENTRX
OS_VPRISM_DIRECTION: SV
OD_AXIS: 046
OS_SPHERE: -2.25
OS_AXIS: 073
OS_OVR_VA: 20/
OD_OVR_VA: 20/
OD_VPRISM_DIRECTION: SV
OD_CYLINDER: -0.50
OD_SPHERE: -2.00
OS_CYLINDER: -0.25

## 2023-06-28 ASSESSMENT — REFRACTION_AUTOREFRACTION
OS_AXIS: 073
OS_CYLINDER: -0.25
OD_CYLINDER: -0.50
OD_AXIS: 051
OS_SPHERE: -1.75
OD_SPHERE: -1.50

## 2023-06-28 ASSESSMENT — TONOMETRY
OD_IOP_MMHG: 17
OS_IOP_MMHG: 19

## 2023-06-28 ASSESSMENT — VISUAL ACUITY
OS_BCVA: 20/20
OD_BCVA: 20/25-

## 2023-06-28 ASSESSMENT — SPHEQUIV_DERIVED
OS_SPHEQUIV: -1.875
OD_SPHEQUIV: -1.75

## 2023-07-12 ENCOUNTER — NON-APPOINTMENT (OUTPATIENT)
Age: 53
End: 2023-07-12

## 2023-07-13 ENCOUNTER — EMERGENCY (EMERGENCY)
Facility: HOSPITAL | Age: 53
LOS: 1 days | Discharge: LEFT BEFORE DISCHARGE | End: 2023-07-13
Payer: COMMERCIAL

## 2023-07-13 ENCOUNTER — NON-APPOINTMENT (OUTPATIENT)
Age: 53
End: 2023-07-13

## 2023-07-13 VITALS
OXYGEN SATURATION: 97 % | WEIGHT: 210.1 LBS | DIASTOLIC BLOOD PRESSURE: 80 MMHG | TEMPERATURE: 98 F | HEIGHT: 63 IN | RESPIRATION RATE: 18 BRPM | SYSTOLIC BLOOD PRESSURE: 140 MMHG | HEART RATE: 58 BPM

## 2023-07-13 PROCEDURE — L9991: CPT

## 2023-07-19 NOTE — DISCUSSION/SUMMARY
[FreeTextEntry1] : Obesity, asthma by hx, post Influenza\par Never smoker\par now at baseline, lung exam is normal, with normal sp02\par spirometry remains normal\par continue prn nebs\par CT scan will small GG nodules RUL/RLL and infiltrate\par Will repeat CT scan 6-8 weeks\par previous sleep work up with mild BEST ( AHI 9) weight loss recommended\par will re evaluate in few months post CT or sooner if needed
Detail Level: Simple

## 2023-07-22 ENCOUNTER — EMERGENCY (EMERGENCY)
Facility: HOSPITAL | Age: 53
LOS: 1 days | Discharge: HOME | End: 2023-07-22
Attending: EMERGENCY MEDICINE
Payer: COMMERCIAL

## 2023-07-22 VITALS
WEIGHT: 211.86 LBS | RESPIRATION RATE: 18 BRPM | HEIGHT: 63 IN | OXYGEN SATURATION: 98 % | HEART RATE: 66 BPM | DIASTOLIC BLOOD PRESSURE: 74 MMHG | SYSTOLIC BLOOD PRESSURE: 113 MMHG | TEMPERATURE: 98 F

## 2023-07-22 PROCEDURE — 99284 EMERGENCY DEPT VISIT MOD MDM: CPT

## 2023-07-22 PROCEDURE — 99283 EMERGENCY DEPT VISIT LOW MDM: CPT

## 2023-07-22 PROCEDURE — 73610 X-RAY EXAM OF ANKLE: CPT | Mod: 26,RT

## 2023-07-22 PROCEDURE — 73610 X-RAY EXAM OF ANKLE: CPT

## 2023-07-22 RX ORDER — IBUPROFEN 200 MG
600 TABLET ORAL ONCE
Refills: 0 | Status: COMPLETED | OUTPATIENT
Start: 2023-07-22 | End: 2023-07-22

## 2023-07-22 RX ORDER — ACETAMINOPHEN 500 MG
650 TABLET ORAL ONCE
Refills: 0 | Status: COMPLETED | OUTPATIENT
Start: 2023-07-22 | End: 2023-07-22

## 2023-07-22 RX ADMIN — Medication 650 MILLIGRAM(S): at 20:48

## 2023-07-22 RX ADMIN — Medication 600 MILLIGRAM(S): at 20:48
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
## 2023-07-22 NOTE — ED PROVIDER NOTE - PHYSICAL EXAMINATION
Gen: Well appearing in NAD  Head: NC/AT  Neck: trachea midline  Resp:  No distress  Ext: no deformities  RLE: healing 2.5 cm laceration to the medial aspect of the achilles region of the ankle. with localized redness surrounding the healing laceration. no crepitus no induration or fluctuance appreciated. FROM of ankle no foot drop. 2+ radial pulse. hypersensitive to touch over the foot. no streaking appreciated   Neuro:  A&O appears non focal  Skin:  Warm and dry as visualized  Psych:  Normal affect and mood

## 2023-07-22 NOTE — ED PROVIDER NOTE - ATTENDING APP SHARED VISIT CONTRIBUTION OF CARE
I agree with the PA's note and was available for any issues/concerns. I was directly involved in patient care. My brief overall assessment is as follows:     patient w/would to leg mild early infection, on antibiotics. no drainage at this time. locaal wound care. outpt Follow up

## 2023-07-22 NOTE — ED PROVIDER NOTE - CLINICAL SUMMARY MEDICAL DECISION MAKING FREE TEXT BOX
53 yo female hx of htn asthma and complex region pain disorder, presenting to the ED nontoxic appearing stable vitals afebrile with laceration to the right inner ankle 10 days ago finishing course of keflex and started on bactrim by her pcp. mild erythema surrounding the laceation without induration fluctuance discharge or streaking. ambulatory on the ankle. PMS intact. advised on wound care fu and return precautions

## 2023-07-22 NOTE — ED PROVIDER NOTE - PATIENT PORTAL LINK FT
You can access the FollowMyHealth Patient Portal offered by [facility] by registering at the following website: [email or web address]. By joining [employer]’s FollowMyHealth portal, you will also be able to view your health information using other applications (apps) compatible with our system.

## 2023-07-22 NOTE — ED PROVIDER NOTE - NSICDXPASTMEDICALHX_GEN_ALL_CORE_FT
PAST MEDICAL HISTORY:  Asthma     CRPS (complex regional pain syndrome), lower limb     Hypertension

## 2023-07-22 NOTE — ED PROVIDER NOTE - NSFOLLOWUPINSTRUCTIONS_ED_ALL_ED_FT
REST   ELEVATE   warm soaks twice a day for the next 5 days   keep area covered   alternate tylenol and ibuprofen for pain control   please complete course of oral antibiotics   new or worsening symptoms including but not limited to : worsening swelling, redness/streaking/discharge, pain, numbness, fever or chills return to the ED       Laceration    A laceration is a cut that goes through all of the layers of the skin and into the tissue that is right under the skin. Some lacerations heal on their own. Others need to be closed with skin adhesive strips, skin glue, stitches (sutures), or staples. Proper laceration care minimizes the risk of infection and helps the laceration to heal better.  If non-absorbable stitches or staples have been placed, they must be taken out within the time frame instructed by your healthcare provider.    SEEK IMMEDIATE MEDICAL CARE IF YOU HAVE ANY OF THE FOLLOWING SYMPTOMS: swelling around the wound, worsening pain, drainage from the wound, red streaking going away from your wound, inability to move finger or toe near the laceration, or discoloration of skin near the laceration.

## 2023-07-22 NOTE — ED PROVIDER NOTE - PROGRESS NOTE DETAILS
advised on warm soaks, coverage of wound and complettion of prescribed antibiotic   advised to ELEVATE the foot and advised on pain control as well as starting cymbalta for pain disorder. xray no acute bony pathology no air appreciated

## 2023-07-22 NOTE — ED ADULT TRIAGE NOTE - CHIEF COMPLAINT QUOTE
c/o of right ankle pain, swelling  prescribed Mupirocin and bactrim, keflex x 5 days but it is getting worst. Hx of HTN, asthma

## 2023-12-07 PROBLEM — G90.529 COMPLEX REGIONAL PAIN SYNDROME I OF UNSPECIFIED LOWER LIMB: Chronic | Status: ACTIVE | Noted: 2023-07-22

## 2024-01-10 ENCOUNTER — NON-APPOINTMENT (OUTPATIENT)
Age: 54
End: 2024-01-10

## 2024-01-19 NOTE — ED PROVIDER NOTE - CROS ED ROS STATEMENT
----- Message from Yusra Benson MD sent at 1/16/2024  2:29 PM CST -----  Hello,    Yes two fragments were submitted (see gross description). One fragment has a TA, the other is benign with a prominent lymphoid aggregate. I only commented on the TA as it was received as a single polyp from the ascending colon. Would you like me to amend the report to reflect the findings in the second polyp? Of note, when submitting multiple polyps from different sites, they generally should be submitted as separate specimens labeled as such to avoid any confusion.    Yusra Benson  ----- Message -----  From: Javier Lyons PA-C  Sent: 1/16/2024   8:43 AM CST  To: Yusra Benson MD; Chester Montanez Ma/Dennis Louisg Pool    Send letter, internal hemorrhoids, diverticulosis, 1 tubular adenoma. It appears a second polyp from the transverse colon was removed however I do not see this in the pathology report. The polyp may have been lost unfortunately. Will send a message to pathology. Will consider the lost polyp precancerous to err on the side of caution. Either way would be a 5 year recall. start taking Benefiber 1 tablespoon daily (fiber supplement) in a large glass of water.    Dr. Benson, by chance were 2 fragments received? If not, looks like a polyp may not have been placed in the specimen container before it was sent to the lab.       
Result letter sent, health history and health maintenance updated, and recall entered.    
all other ROS negative except as per HPI

## 2024-03-13 ENCOUNTER — APPOINTMENT (OUTPATIENT)
Dept: GASTROENTEROLOGY | Facility: CLINIC | Age: 54
End: 2024-03-13
Payer: COMMERCIAL

## 2024-03-13 VITALS
RESPIRATION RATE: 16 BRPM | SYSTOLIC BLOOD PRESSURE: 135 MMHG | OXYGEN SATURATION: 97 % | DIASTOLIC BLOOD PRESSURE: 75 MMHG | HEART RATE: 67 BPM

## 2024-03-13 DIAGNOSIS — K90.0 CELIAC DISEASE: ICD-10-CM

## 2024-03-13 DIAGNOSIS — R14.0 ABDOMINAL DISTENSION (GASEOUS): ICD-10-CM

## 2024-03-13 DIAGNOSIS — K21.9 GASTRO-ESOPHAGEAL REFLUX DISEASE W/OUT ESOPHAGITIS: ICD-10-CM

## 2024-03-13 DIAGNOSIS — Z83.719 FAMILY HISTORY OF COLON POLYPS, UNSPECIFIED: ICD-10-CM

## 2024-03-13 PROCEDURE — 99214 OFFICE O/P EST MOD 30 MIN: CPT

## 2024-03-13 RX ORDER — HYOSCYAMINE SULFATE 0.12 MG/1
0.12 TABLET ORAL EVERY 4 HOURS
Qty: 540 | Refills: 1 | Status: DISCONTINUED | COMMUNITY
Start: 2022-01-19 | End: 2024-03-13

## 2024-03-13 RX ORDER — HYOSCYAMINE SULFATE 0.12 MG/1
0.12 TABLET SUBLINGUAL 4 TIMES DAILY
Qty: 360 | Refills: 1 | Status: ACTIVE | COMMUNITY
Start: 2022-03-21 | End: 1900-01-01

## 2024-03-13 RX ORDER — ESOMEPRAZOLE MAGNESIUM 20 MG/1
20 CAPSULE, DELAYED RELEASE ORAL
Refills: 0 | Status: ACTIVE | COMMUNITY

## 2024-03-13 RX ORDER — METHYLPREDNISOLONE 4 MG/1
4 TABLET ORAL
Qty: 1 | Refills: 0 | Status: DISCONTINUED | COMMUNITY
Start: 2022-01-14 | End: 2024-03-13

## 2024-03-13 RX ORDER — AZELASTINE HYDROCHLORIDE AND FLUTICASONE PROPIONATE 137; 50 UG/1; UG/1
137-50 SPRAY, METERED NASAL TWICE DAILY
Qty: 1 | Refills: 5 | Status: DISCONTINUED | COMMUNITY
Start: 2018-05-11 | End: 2024-03-13

## 2024-03-13 RX ORDER — DICLOFENAC EPOLAMINE 0.01 G/1
1.3 SYSTEM TOPICAL
Qty: 30 | Refills: 0 | Status: DISCONTINUED | COMMUNITY
Start: 2018-02-22 | End: 2024-03-13

## 2024-03-13 RX ORDER — METHYLPREDNISOLONE 4 MG/1
4 TABLET ORAL
Qty: 1 | Refills: 0 | Status: DISCONTINUED | COMMUNITY
Start: 2022-07-22 | End: 2024-03-13

## 2024-03-13 RX ORDER — SUCRALFATE 1 G/1
1 TABLET ORAL 4 TIMES DAILY
Qty: 360 | Refills: 2 | Status: DISCONTINUED | COMMUNITY
Start: 2022-01-19 | End: 2024-03-13

## 2024-03-13 RX ORDER — LEVALBUTEROL HYDROCHLORIDE 1.25 MG/3ML
1.25 SOLUTION RESPIRATORY (INHALATION)
Refills: 0 | Status: DISCONTINUED | COMMUNITY
End: 2024-03-13

## 2024-03-13 RX ORDER — LISINOPRIL 10 MG/1
10 TABLET ORAL
Refills: 0 | Status: ACTIVE | COMMUNITY

## 2024-03-13 NOTE — ASSESSMENT
Spoke with patient, tested positive for Covid on 10/31/2020, feels sick: fatigue, cough, sore throat, fever, weakness, states her job requests a note patient is sick, unable to work.  Writer explained per CDC guidelines patient sick with Covid can't work until symptom free and/or quarantine over.  Patient verbalized understanding, will call when better for work excuse- that stayed home due to pos Covid test and illness.   [FreeTextEntry1] : Patient is a 53 year old female, with PMH of celiac disease, HTN, who presents for follow up visit. Known celiac disease, positive on pathology from EGD in 2022. Admits to poor compliance with gluten free diet.  - Continue Nexium 20mg otc PO QD, prn - Labs for celiac ab ordered   Colon cancer screening due in 2025 due to family h/o colon polyps   I, Dr. Jazmin Lora was present during the history and physical Abdominal exam positive bowel sounds soft nontender Patient with a history of celiac.  Is not strictly compliant with her gluten-free diet.  Has bloating.  Take small amounts of food secondary to the bloating.  EGD in 2022 shows scalloping, biopsy showed intraepithelial lymphocytes Agree with checking celiac antibodies to check for compliance. Will try Levsin 3 times daily as needed bloating  GERD Nexium 20 mg a day Today's instructions for acid reflux include avoid provocative foods. For example citrus alcohol coffee chocolate mints. Smaller meals, no eating 3 hours prior to bedtime and elevate head of the bed prior to sleep.  Family history of colon polyps-due for colonoscopy 2027.  Colonoscopy 2022 showed hemorrhoids.

## 2024-03-13 NOTE — PHYSICAL EXAM
[Alert] : alert [Normal Voice/Communication] : normal voice/communication [Healthy Appearing] : healthy appearing [No Acute Distress] : no acute distress [Hearing Threshold Finger Rub Not [place]] : hearing was normal [Sclera] : the sclera and conjunctiva were normal [Normal Lips/Gums] : the lips and gums were normal [Normal Appearance] : the appearance of the neck was normal [No Neck Mass] : no neck mass was observed [No Respiratory Distress] : no respiratory distress [No Acc Muscle Use] : no accessory muscle use [Respiration, Rhythm And Depth] : normal respiratory rhythm and effort [Auscultation Breath Sounds / Voice Sounds] : lungs were clear to auscultation bilaterally [Heart Rate And Rhythm] : heart rate was normal and rhythm regular [Normal S1, S2] : normal S1 and S2 [Bowel Sounds] : normal bowel sounds [Abdomen Tenderness] : non-tender [No Masses] : no abdominal mass palpated [Abdomen Soft] : soft [] : no hepatosplenomegaly [Oriented To Time, Place, And Person] : oriented to person, place, and time [de-identified] : +diastasis recti

## 2024-03-13 NOTE — ADDENDUM
[FreeTextEntry1] : I, Michell Crews PA-C, acted as a scribe for the services dictated to me by JUVENAL Myles in this document on Mar 13, 2024 for JAIR NEWSOME

## 2024-03-13 NOTE — HISTORY OF PRESENT ILLNESS
[de-identified] : EGD in 3/2022 showed hiatal hernia, scalloping of D2, +focal villous blunting on pathology [FreeTextEntry1] : 3/2022 showed internal hemorrhoids, otherwise normal. Repeat in 5 years.

## 2024-03-13 NOTE — REASON FOR VISIT
[Follow-up] : a follow-up of an existing diagnosis [FreeTextEntry1] : bloating, celiac disease, family h/o colon polyps

## 2024-03-23 ENCOUNTER — LABORATORY RESULT (OUTPATIENT)
Age: 54
End: 2024-03-23

## 2024-03-27 LAB
ALBUMIN SERPL ELPH-MCNC: 4.3 G/DL
ALP BLD-CCNC: 80 U/L
ALT SERPL-CCNC: 16 U/L
ANION GAP SERPL CALC-SCNC: 8 MMOL/L
AST SERPL-CCNC: 12 U/L
BASOPHILS # BLD AUTO: 0.08 K/UL
BASOPHILS NFR BLD AUTO: 0.9 %
BILIRUB SERPL-MCNC: 0.3 MG/DL
BUN SERPL-MCNC: 15 MG/DL
CALCIUM SERPL-MCNC: 9.3 MG/DL
CHLORIDE SERPL-SCNC: 109 MMOL/L
CO2 SERPL-SCNC: 26 MMOL/L
CREAT SERPL-MCNC: 0.79 MG/DL
EGFR: 89 ML/MIN/1.73M2
ENDOMYSIUM IGA SER QL: POSITIVE
ENDOMYSIUM IGA TITR SER: ABNORMAL
EOSINOPHIL # BLD AUTO: 0.21 K/UL
EOSINOPHIL NFR BLD AUTO: 2.4 %
GLIADIN IGA SER QL: 120.1 UNITS
GLIADIN IGG SER QL: 80.5 UNITS
GLIADIN PEPTIDE IGA SER-ACNC: POSITIVE
GLIADIN PEPTIDE IGG SER-ACNC: POSITIVE
GLUCOSE SERPL-MCNC: 87 MG/DL
HCT VFR BLD CALC: 42.2 %
HGB BLD-MCNC: 13.4 G/DL
IGA SER QL IEP: 258 MG/DL
IMM GRANULOCYTES NFR BLD AUTO: 0.6 %
INR PPP: 0.93 RATIO
LYMPHOCYTES # BLD AUTO: 2.22 K/UL
LYMPHOCYTES NFR BLD AUTO: 25.5 %
MAN DIFF?: NORMAL
MCHC RBC-ENTMCNC: 25.3 PG
MCHC RBC-ENTMCNC: 31.8 GM/DL
MCV RBC AUTO: 79.8 FL
MONOCYTES # BLD AUTO: 0.64 K/UL
MONOCYTES NFR BLD AUTO: 7.3 %
NEUTROPHILS # BLD AUTO: 5.51 K/UL
NEUTROPHILS NFR BLD AUTO: 63.3 %
PLATELET # BLD AUTO: 283 K/UL
POTASSIUM SERPL-SCNC: 4.4 MMOL/L
PROT SERPL-MCNC: 6.8 G/DL
PT BLD: 10.5 SEC
RBC # BLD: 5.29 M/UL
RBC # FLD: 14.6 %
SODIUM SERPL-SCNC: 144 MMOL/L
TSH SERPL-ACNC: 2.03 UIU/ML
TTG IGA SER IA-ACNC: 7.1 U/ML
TTG IGA SER-ACNC: ABNORMAL
WBC # FLD AUTO: 8.71 K/UL

## 2024-03-29 ENCOUNTER — OFFICE (OUTPATIENT)
Dept: URBAN - METROPOLITAN AREA CLINIC 115 | Facility: CLINIC | Age: 54
Setting detail: OPHTHALMOLOGY
End: 2024-03-29
Payer: COMMERCIAL

## 2024-03-29 DIAGNOSIS — H10.89: ICD-10-CM

## 2024-03-29 DIAGNOSIS — H01.001: ICD-10-CM

## 2024-03-29 DIAGNOSIS — H01.005: ICD-10-CM

## 2024-03-29 DIAGNOSIS — H01.004: ICD-10-CM

## 2024-03-29 DIAGNOSIS — H00.12: ICD-10-CM

## 2024-03-29 DIAGNOSIS — H01.002: ICD-10-CM

## 2024-03-29 PROCEDURE — 99213 OFFICE O/P EST LOW 20 MIN: CPT | Performed by: OPTOMETRIST

## 2024-03-29 ASSESSMENT — REFRACTION_CURRENTRX
OS_SPHERE: -2.25
OS_AXIS: 073
OD_OVR_VA: 20/
OS_VPRISM_DIRECTION: SV
OD_VPRISM_DIRECTION: SV
OS_OVR_VA: 20/
OD_SPHERE: -2.00
OS_CYLINDER: -0.25
OD_AXIS: 046
OD_CYLINDER: -0.50

## 2024-03-29 ASSESSMENT — LID EXAM ASSESSMENTS
OD_BLEPHARITIS: RLL RUL 2+
OS_BLEPHARITIS: LLL LUL 2+

## 2024-04-01 ENCOUNTER — NON-APPOINTMENT (OUTPATIENT)
Age: 54
End: 2024-04-01

## 2024-04-03 LAB
CELIAC DISEASE INTERPRETATION: NORMAL
CELIAC GENE PAIRS PRESENT: YES
DQ ALPHA 1: NORMAL
DQ BETA 1: NORMAL
IMMUNOGLOBULIN A (IGA): 251 MG/DL

## 2024-06-07 ENCOUNTER — APPOINTMENT (OUTPATIENT)
Dept: GASTROENTEROLOGY | Facility: CLINIC | Age: 54
End: 2024-06-07

## 2024-07-03 ENCOUNTER — OFFICE (OUTPATIENT)
Dept: URBAN - METROPOLITAN AREA CLINIC 115 | Facility: CLINIC | Age: 54
Setting detail: OPHTHALMOLOGY
End: 2024-07-03
Payer: COMMERCIAL

## 2024-07-03 DIAGNOSIS — H25.13: ICD-10-CM

## 2024-07-03 DIAGNOSIS — H52.13: ICD-10-CM

## 2024-07-03 DIAGNOSIS — H35.033: ICD-10-CM

## 2024-07-03 DIAGNOSIS — H01.001: ICD-10-CM

## 2024-07-03 DIAGNOSIS — H43.813: ICD-10-CM

## 2024-07-03 PROBLEM — H35.40 PERIPAPILLARY ATROPHY: Status: ACTIVE | Noted: 2024-07-03

## 2024-07-03 PROCEDURE — 92015 DETERMINE REFRACTIVE STATE: CPT | Performed by: OPTOMETRIST

## 2024-07-03 PROCEDURE — 92250 FUNDUS PHOTOGRAPHY W/I&R: CPT | Performed by: OPTOMETRIST

## 2024-07-03 PROCEDURE — 92014 COMPRE OPH EXAM EST PT 1/>: CPT | Performed by: OPTOMETRIST

## 2024-07-03 ASSESSMENT — LID EXAM ASSESSMENTS
OS_BLEPHARITIS: LLL LUL 2+
OD_BLEPHARITIS: RLL RUL 2+

## 2024-07-03 ASSESSMENT — CONFRONTATIONAL VISUAL FIELD TEST (CVF)
OS_FINDINGS: FULL
OD_FINDINGS: FULL

## 2024-12-19 ENCOUNTER — EMERGENCY (EMERGENCY)
Facility: HOSPITAL | Age: 54
LOS: 1 days | Discharge: HOME | End: 2024-12-19
Attending: EMERGENCY MEDICINE
Payer: COMMERCIAL

## 2024-12-19 VITALS
SYSTOLIC BLOOD PRESSURE: 131 MMHG | DIASTOLIC BLOOD PRESSURE: 89 MMHG | RESPIRATION RATE: 18 BRPM | WEIGHT: 214.29 LBS | OXYGEN SATURATION: 98 % | HEART RATE: 110 BPM | TEMPERATURE: 99 F

## 2024-12-19 PROCEDURE — 99285 EMERGENCY DEPT VISIT HI MDM: CPT

## 2024-12-19 PROCEDURE — 71045 X-RAY EXAM CHEST 1 VIEW: CPT | Mod: 26

## 2024-12-19 PROCEDURE — 93010 ELECTROCARDIOGRAM REPORT: CPT

## 2024-12-19 RX ORDER — ALBUTEROL 90 MCG
1 AEROSOL (GRAM) INHALATION ONCE
Refills: 0 | Status: COMPLETED | OUTPATIENT
Start: 2024-12-19 | End: 2024-12-19

## 2024-12-19 RX ORDER — PREDNISONE 20 MG/1
50 TABLET ORAL ONCE
Refills: 0 | Status: COMPLETED | OUTPATIENT
Start: 2024-12-19 | End: 2024-12-19

## 2024-12-19 NOTE — ED PROVIDER NOTE - CLINICAL SUMMARY MEDICAL DECISION MAKING FREE TEXT BOX
L knee OA limiting patients ability to perform functional ADL's/transfers and mobility 55 yo female with pmhx of HTN on lisinopril, asthma (hospitalized 2x, no intubations) presents for evaluation of cough. States that these symptoms are consistent with her asthma exacerbations. States her symptoms have been ongoing for 5 days. Was prescribed augmentin. was switched from po prednisone (took 1 dose of 20 mg 6 days ago) to nebulized steroids ?. States she is still experiencing symptoms and doesn't feel much better in terms of breathing. states her symptoms still feel like her asthma exacerbation. pt found to be slightly tachycardic. will get d-dimer, labs, and imaging.     D-dimer elevated, CTA performed. Revealed foci of clustered nodularity within the left upper and lower lobes suspicious for infectious process. Pt already on augmentin, will add azithromycin. Will continue pt on PO steroids as well. Pt well appearing, in NAD, non-toxic appearing. Not hypoxic. No tachypnea, lungs clear on exam. I had lengthy discussion with patient  regarding their symptoms, provided re-assurance and educated pt on strict return precautions. Pt educated on proper supportive care. Stable for discharge home.

## 2024-12-19 NOTE — ED PROVIDER NOTE - PHYSICAL EXAMINATION
Gen: No acute distress, non toxic  HEENT: Mucous membranes moist, pink conjunctivae, EOMI. PERRL. Airway patent, uvula midline. posterior pharynx without erythema or exudates.   Neck: trachea midline.   CV: RRR, nl s1/s2.  Resp: CTAB, normal rate and effort. No wheezes, rhonchi or crackles. no evidence of respiratory distress. speaking in full sentences.   GI: Abdomen soft, NT, ND. No rebound, no guarding  Neuro: A&O x 3, moving all 4 extremities  MSK: No spine or joint tenderness to palpation  Skin: No rashes. intact and perfused. cap refill <2sec  Vascular: Radial and dorsalis pedal pulses 2+ b/l. No LE edema. no calf ttp or swelling.

## 2024-12-19 NOTE — ED PROVIDER NOTE - PATIENT PORTAL LINK FT
You can access the FollowMyHealth Patient Portal offered by [facility] by registering at the following website: [email or web address]. By joining [employer]’s FollowMyHealth portal, you will also be able to view your health information using other applications (apps) compatible with our system.

## 2024-12-19 NOTE — ED PROVIDER NOTE - NSFOLLOWUPINSTRUCTIONS_ED_ALL_ED_FT
- Please follow-up with your primary care doctor in the next 1-2 days.  Please call tomorrow for an appointment.  If you cannot follow-up with your primary care doctor please return to the ED for any urgent issues.  - You were given a copy of the tests performed today.  Please bring the results with you and review them with your primary care doctor.  - If you have any worsening of symptoms or any other concerns please return to the ED immediately.  - Please continue taking your home medications as directed.   - Take antibiotics as prescribed. Take medication with food to prevent upset stomach.     Pneumonia    Pneumonia is an infection of the lungs. Pneumonia may be caused by bacteria, viruses, or funguses. Symptoms include coughing, fever, chest pain when breathing deeply or coughing, shortness of breath, fatigue, or muscle aches. Pneumonia can be diagnosed with a medical history and physical exam, as well as other tests which may include a chest X-ray. If you were prescribed an antibiotic medicine, take it as told by your health care provider and do not stop taking the antibiotic even if you start to feel better. Do not use tobacco products, including cigarettes, chewing tobacco, and e-cigarettes.    SEEK IMMEDIATE MEDICAL CARE IF YOU HAVE ANY OF THE FOLLOWING SYMPTOMS: worsening shortness of breath, worsening chest pain, coughing up blood, change in mental status, lightheadedness/dizziness.

## 2024-12-19 NOTE — ED PROVIDER NOTE - ATTENDING APP SHARED VISIT CONTRIBUTION OF CARE
[place]: I performed a face to face bedside interview with patient regarding history of present illness, review of symptoms and past medical history. I completed an independent physical exam.  I have discussed patient's plan of care with advanced care provider.   I agree with note as stated above including HISTORY OF PRESENT ILLNESS, HIV, PAST MEDICAL/SURGICAL/FAMILY/SOCIAL HISTORY, ALLERGIES AND HOME MEDICATIONS, REVIEW OF SYSTEMS, PHYSICAL EXAM, MEDICAL DECISION MAKING and any PROGRESS NOTES during the time I functioned as the attending physician for this patient  unless otherwise noted. My brief assessment is as follows: 55 y/o female pw 4 days of feeling like her asthma exacerbation. was on steroids, and started on augmentin. has some sob, cough. no fever. no chest pain, no hx cardiac disease or PE. no leg pain/immobilization. labs, xr.

## 2024-12-19 NOTE — ED PROVIDER NOTE - OBJECTIVE STATEMENT
55 yo female with pmhx of HTN on lisinopril, asthma (hospitalized 2x, no intubations) presents for evaluation of cough. Pt reports that 5 days ago, started to develop a cough and wheezing, that felt like her asthma exacerbations. States that she had prednisone at home so took 20 mg and her albuterol nebulizer. Went to her pcp 4 days ago, told her to stop the prednisone and only do the nebulized steroids ?, started her on augmentin, and promethazine medication. States she still feels short of breath and coughing. States it still feels like her asthma exacerbation. Denies recent travel or recent immobilizations. Denies fever, chills, body aches, dizziness, LOC, vision changes, CP, palpitations, abd pain, n/v/c/d, dysuria, hematuria, paresthesias in the extremities, rashes.

## 2024-12-19 NOTE — ED PROVIDER NOTE - CARE PROVIDER_API CALL
Gonzales Jeffrey  Pulmonary Disease  [address], [address]  [place] [address]  Phone: [phone]  Fax: [phone]  Follow Up Time:

## 2024-12-20 VITALS
TEMPERATURE: 98 F | HEART RATE: 76 BPM | DIASTOLIC BLOOD PRESSURE: 74 MMHG | OXYGEN SATURATION: 98 % | RESPIRATION RATE: 20 BRPM | SYSTOLIC BLOOD PRESSURE: 127 MMHG

## 2024-12-20 LAB
ALBUMIN SERPL ELPH-MCNC: 3.8 G/DL — SIGNIFICANT CHANGE UP (ref 3.3–5.2)
ALP SERPL-CCNC: 94 U/L — SIGNIFICANT CHANGE UP (ref 40–120)
ALT FLD-CCNC: 46 U/L — HIGH
ANION GAP SERPL CALC-SCNC: 11 MMOL/L — SIGNIFICANT CHANGE UP (ref 5–17)
AST SERPL-CCNC: 42 U/L — HIGH
BASOPHILS # BLD AUTO: 0.08 K/UL — SIGNIFICANT CHANGE UP (ref 0–0.2)
BASOPHILS NFR BLD AUTO: 0.5 % — SIGNIFICANT CHANGE UP (ref 0–2)
BILIRUB SERPL-MCNC: 0.5 MG/DL — SIGNIFICANT CHANGE UP (ref 0.4–2)
BUN SERPL-MCNC: 9.7 MG/DL — SIGNIFICANT CHANGE UP (ref 8–20)
CALCIUM SERPL-MCNC: 8.7 MG/DL — SIGNIFICANT CHANGE UP (ref 8.4–10.5)
CHLORIDE SERPL-SCNC: 103 MMOL/L — SIGNIFICANT CHANGE UP (ref 96–108)
CO2 SERPL-SCNC: 25 MMOL/L — SIGNIFICANT CHANGE UP (ref 22–29)
CREAT SERPL-MCNC: 0.87 MG/DL — SIGNIFICANT CHANGE UP (ref 0.5–1.3)
D DIMER BLD IA.RAPID-MCNC: 347 NG/ML DDU — HIGH
EGFR: 79 ML/MIN/1.73M2 — SIGNIFICANT CHANGE UP
EOSINOPHIL # BLD AUTO: 0.12 K/UL — SIGNIFICANT CHANGE UP (ref 0–0.5)
EOSINOPHIL NFR BLD AUTO: 0.7 % — SIGNIFICANT CHANGE UP (ref 0–6)
FLUAV AG NPH QL: SIGNIFICANT CHANGE UP
FLUBV AG NPH QL: SIGNIFICANT CHANGE UP
GLUCOSE SERPL-MCNC: 117 MG/DL — HIGH (ref 70–99)
HCT VFR BLD CALC: 40.7 % — SIGNIFICANT CHANGE UP (ref 34.5–45)
HGB BLD-MCNC: 13.4 G/DL — SIGNIFICANT CHANGE UP (ref 11.5–15.5)
IMM GRANULOCYTES NFR BLD AUTO: 0.6 % — SIGNIFICANT CHANGE UP (ref 0–0.9)
LYMPHOCYTES # BLD AUTO: 1.84 K/UL — SIGNIFICANT CHANGE UP (ref 1–3.3)
LYMPHOCYTES # BLD AUTO: 11.4 % — LOW (ref 13–44)
MCHC RBC-ENTMCNC: 25.1 PG — LOW (ref 27–34)
MCHC RBC-ENTMCNC: 32.9 G/DL — SIGNIFICANT CHANGE UP (ref 32–36)
MCV RBC AUTO: 76.2 FL — LOW (ref 80–100)
MONOCYTES # BLD AUTO: 1.33 K/UL — HIGH (ref 0–0.9)
MONOCYTES NFR BLD AUTO: 8.3 % — SIGNIFICANT CHANGE UP (ref 2–14)
NEUTROPHILS # BLD AUTO: 12.63 K/UL — HIGH (ref 1.8–7.4)
NEUTROPHILS NFR BLD AUTO: 78.5 % — HIGH (ref 43–77)
PLATELET # BLD AUTO: 238 K/UL — SIGNIFICANT CHANGE UP (ref 150–400)
POTASSIUM SERPL-MCNC: 4 MMOL/L — SIGNIFICANT CHANGE UP (ref 3.5–5.3)
POTASSIUM SERPL-SCNC: 4 MMOL/L — SIGNIFICANT CHANGE UP (ref 3.5–5.3)
PROT SERPL-MCNC: 7 G/DL — SIGNIFICANT CHANGE UP (ref 6.6–8.7)
RBC # BLD: 5.34 M/UL — HIGH (ref 3.8–5.2)
RBC # FLD: 14 % — SIGNIFICANT CHANGE UP (ref 10.3–14.5)
RSV RNA NPH QL NAA+NON-PROBE: SIGNIFICANT CHANGE UP
SARS-COV-2 RNA SPEC QL NAA+PROBE: SIGNIFICANT CHANGE UP
SODIUM SERPL-SCNC: 139 MMOL/L — SIGNIFICANT CHANGE UP (ref 135–145)
WBC # BLD: 16.09 K/UL — HIGH (ref 3.8–10.5)
WBC # FLD AUTO: 16.09 K/UL — HIGH (ref 3.8–10.5)

## 2024-12-20 PROCEDURE — 85025 COMPLETE CBC W/AUTO DIFF WBC: CPT

## 2024-12-20 PROCEDURE — 94640 AIRWAY INHALATION TREATMENT: CPT

## 2024-12-20 PROCEDURE — 80053 COMPREHEN METABOLIC PANEL: CPT

## 2024-12-20 PROCEDURE — 71275 CT ANGIOGRAPHY CHEST: CPT | Mod: MC

## 2024-12-20 PROCEDURE — 99285 EMERGENCY DEPT VISIT HI MDM: CPT | Mod: 25

## 2024-12-20 PROCEDURE — 71045 X-RAY EXAM CHEST 1 VIEW: CPT

## 2024-12-20 PROCEDURE — 36415 COLL VENOUS BLD VENIPUNCTURE: CPT

## 2024-12-20 PROCEDURE — 85379 FIBRIN DEGRADATION QUANT: CPT

## 2024-12-20 PROCEDURE — 93005 ELECTROCARDIOGRAM TRACING: CPT

## 2024-12-20 PROCEDURE — 71275 CT ANGIOGRAPHY CHEST: CPT | Mod: 26,MC

## 2024-12-20 PROCEDURE — 87637 SARSCOV2&INF A&B&RSV AMP PRB: CPT

## 2024-12-20 RX ORDER — PREDNISONE 20 MG/1
2 TABLET ORAL
Qty: 8 | Refills: 0
Start: 2024-12-20 | End: 2024-12-23

## 2024-12-20 RX ORDER — AZITHROMYCIN 250 MG/1
1 TABLET, FILM COATED ORAL
Qty: 1 | Refills: 0
Start: 2024-12-20 | End: 2024-12-24

## 2024-12-20 RX ADMIN — Medication 1 PUFF(S): at 00:33

## 2024-12-20 RX ADMIN — PREDNISONE 50 MILLIGRAM(S): 20 TABLET ORAL at 00:33

## 2025-03-25 ENCOUNTER — APPOINTMENT (OUTPATIENT)
Dept: GASTROENTEROLOGY | Facility: CLINIC | Age: 55
End: 2025-03-25
Payer: COMMERCIAL

## 2025-03-25 VITALS
SYSTOLIC BLOOD PRESSURE: 128 MMHG | RESPIRATION RATE: 16 BRPM | HEIGHT: 63 IN | HEART RATE: 70 BPM | WEIGHT: 215 LBS | OXYGEN SATURATION: 98 % | DIASTOLIC BLOOD PRESSURE: 72 MMHG | BODY MASS INDEX: 38.09 KG/M2

## 2025-03-25 PROCEDURE — 99213 OFFICE O/P EST LOW 20 MIN: CPT

## 2025-03-25 RX ORDER — LISINOPRIL 20 MG/1
20 TABLET ORAL
Refills: 0 | Status: ACTIVE | COMMUNITY

## 2025-07-01 ENCOUNTER — APPOINTMENT (OUTPATIENT)
Dept: PULMONOLOGY | Facility: CLINIC | Age: 55
End: 2025-07-01
Payer: COMMERCIAL

## 2025-07-01 VITALS
DIASTOLIC BLOOD PRESSURE: 74 MMHG | RESPIRATION RATE: 16 BRPM | OXYGEN SATURATION: 97 % | BODY MASS INDEX: 36.86 KG/M2 | HEIGHT: 63 IN | SYSTOLIC BLOOD PRESSURE: 124 MMHG | WEIGHT: 208 LBS | HEART RATE: 65 BPM

## 2025-07-01 PROBLEM — I27.81 COR PULMONALE: Status: ACTIVE | Noted: 2025-07-01

## 2025-07-01 PROCEDURE — 99205 OFFICE O/P NEW HI 60 MIN: CPT | Mod: 25

## 2025-07-01 PROCEDURE — 94010 BREATHING CAPACITY TEST: CPT

## 2025-07-01 RX ORDER — ALBUTEROL SULFATE 2.5 MG/3ML
(2.5 MG/3ML) SOLUTION RESPIRATORY (INHALATION)
Refills: 0 | Status: ACTIVE | COMMUNITY

## 2025-07-01 RX ORDER — ALBUTEROL SULFATE 90 UG/1
108 (90 BASE) INHALANT RESPIRATORY (INHALATION)
Refills: 0 | Status: ACTIVE | COMMUNITY

## 2025-07-02 ENCOUNTER — NON-APPOINTMENT (OUTPATIENT)
Age: 55
End: 2025-07-02

## 2025-07-04 ENCOUNTER — NON-APPOINTMENT (OUTPATIENT)
Age: 55
End: 2025-07-04

## 2025-07-06 ENCOUNTER — NON-APPOINTMENT (OUTPATIENT)
Age: 55
End: 2025-07-06

## 2025-07-07 ENCOUNTER — NON-APPOINTMENT (OUTPATIENT)
Age: 55
End: 2025-07-07

## 2025-07-18 ENCOUNTER — OUTPATIENT (OUTPATIENT)
Dept: OUTPATIENT SERVICES | Facility: HOSPITAL | Age: 55
LOS: 1 days | End: 2025-07-18
Payer: COMMERCIAL

## 2025-07-18 DIAGNOSIS — G47.33 OBSTRUCTIVE SLEEP APNEA (ADULT) (PEDIATRIC): ICD-10-CM

## 2025-07-18 PROCEDURE — 95800 SLP STDY UNATTENDED: CPT | Mod: 26

## 2025-08-12 ENCOUNTER — APPOINTMENT (OUTPATIENT)
Dept: PULMONOLOGY | Facility: CLINIC | Age: 55
End: 2025-08-12
Payer: COMMERCIAL

## 2025-08-12 VITALS
WEIGHT: 208.3 LBS | OXYGEN SATURATION: 98 % | RESPIRATION RATE: 16 BRPM | DIASTOLIC BLOOD PRESSURE: 72 MMHG | HEART RATE: 67 BPM | BODY MASS INDEX: 36.91 KG/M2 | HEIGHT: 63 IN | SYSTOLIC BLOOD PRESSURE: 100 MMHG

## 2025-08-12 DIAGNOSIS — Z71.89 OTHER SPECIFIED COUNSELING: ICD-10-CM

## 2025-08-12 DIAGNOSIS — G47.33 OBSTRUCTIVE SLEEP APNEA (ADULT) (PEDIATRIC): ICD-10-CM

## 2025-08-12 PROCEDURE — 99205 OFFICE O/P NEW HI 60 MIN: CPT

## 2025-08-12 PROCEDURE — G2211 COMPLEX E/M VISIT ADD ON: CPT | Mod: NC

## 2025-09-04 ENCOUNTER — APPOINTMENT (OUTPATIENT)
Dept: GASTROENTEROLOGY | Facility: CLINIC | Age: 55
End: 2025-09-04
Payer: COMMERCIAL

## 2025-09-04 VITALS
OXYGEN SATURATION: 98 % | SYSTOLIC BLOOD PRESSURE: 124 MMHG | HEIGHT: 63 IN | BODY MASS INDEX: 36.32 KG/M2 | DIASTOLIC BLOOD PRESSURE: 85 MMHG | WEIGHT: 205 LBS | RESPIRATION RATE: 14 BRPM | HEART RATE: 77 BPM

## 2025-09-04 DIAGNOSIS — Z09 ENCOUNTER FOR FOLLOW-UP EXAMINATION AFTER COMPLETED TREATMENT FOR CONDITIONS OTHER THAN MALIGNANT NEOPLASM: ICD-10-CM

## 2025-09-04 DIAGNOSIS — K90.0 CELIAC DISEASE: ICD-10-CM

## 2025-09-04 PROCEDURE — 99213 OFFICE O/P EST LOW 20 MIN: CPT

## 2025-09-04 PROCEDURE — G2211 COMPLEX E/M VISIT ADD ON: CPT | Mod: NC

## (undated) DEVICE — FORCEP RADIAL JAW 4 W NDL 2.4MM 2.8MM 240CM ORANGE DISP

## (undated) DEVICE — VALVE ENDOSCOPE DEFENDO SINGLE USE